# Patient Record
Sex: FEMALE | Race: OTHER | Employment: FULL TIME | URBAN - NONMETROPOLITAN AREA
[De-identification: names, ages, dates, MRNs, and addresses within clinical notes are randomized per-mention and may not be internally consistent; named-entity substitution may affect disease eponyms.]

---

## 2018-08-23 ENCOUNTER — OFFICE VISIT (OUTPATIENT)
Dept: FAMILY MEDICINE CLINIC | Age: 48
End: 2018-08-23
Payer: COMMERCIAL

## 2018-08-23 VITALS
BODY MASS INDEX: 21.24 KG/M2 | HEART RATE: 72 BPM | OXYGEN SATURATION: 98 % | WEIGHT: 108.2 LBS | RESPIRATION RATE: 12 BRPM | DIASTOLIC BLOOD PRESSURE: 58 MMHG | TEMPERATURE: 98.5 F | HEIGHT: 60 IN | SYSTOLIC BLOOD PRESSURE: 122 MMHG

## 2018-08-23 DIAGNOSIS — R10.30 LOWER ABDOMINAL PAIN: ICD-10-CM

## 2018-08-23 DIAGNOSIS — Z13.220 SCREENING CHOLESTEROL LEVEL: ICD-10-CM

## 2018-08-23 DIAGNOSIS — Z11.4 ENCOUNTER FOR SCREENING FOR HIV: ICD-10-CM

## 2018-08-23 DIAGNOSIS — R20.0 HAND NUMBNESS: ICD-10-CM

## 2018-08-23 DIAGNOSIS — R79.89 LOW VITAMIN D LEVEL: ICD-10-CM

## 2018-08-23 DIAGNOSIS — Z23 NEED FOR PROPHYLACTIC VACCINATION AGAINST DIPHTHERIA-TETANUS-PERTUSSIS (DTP): Primary | ICD-10-CM

## 2018-08-23 LAB
AMORPHOUS: ABNORMAL
BACTERIA: ABNORMAL /HPF
BILIRUBIN URINE: NEGATIVE
BILIRUBIN URINE: NEGATIVE
BILIRUBIN, POC: NORMAL
BLOOD URINE, POC: ABNORMAL
BLOOD URINE, POC: NORMAL
BLOOD, URINE: ABNORMAL
CASTS UA: ABNORMAL /LPF
CHARACTER, URINE: ABNORMAL
CHARACTER, URINE: CLEAR
CLARITY, POC: NORMAL
COLOR, POC: NORMAL
COLOR, URINE: YELLOW
COLOR: YELLOW
CRYSTALS, UA: ABNORMAL
EPITHELIAL CELLS, UA: ABNORMAL /HPF
GLUCOSE URINE, POC: NORMAL
GLUCOSE URINE: NEGATIVE MG/DL
GLUCOSE URINE: NEGATIVE MG/DL
KETONES, POC: NORMAL
KETONES, URINE: NEGATIVE
KETONES, URINE: NEGATIVE
LEUKOCYTE CLUMPS, URINE: ABNORMAL
LEUKOCYTE EST, POC: NORMAL
LEUKOCYTE ESTERASE, URINE: ABNORMAL
NITRITE, POC: NORMAL
NITRITE, URINE: NEGATIVE
NITRITE, URINE: NEGATIVE
PH UA: 5.5
PH, POC: NORMAL
PH, URINE: 6
PROTEIN UA: NEGATIVE
PROTEIN, POC: NORMAL
PROTEIN, URINE: NEGATIVE MG/DL
RBC URINE: ABNORMAL /HPF
SPECIFIC GRAVITY, POC: NORMAL
SPECIFIC GRAVITY, URINE: 1.02 (ref 1–1.03)
SPECIFIC GRAVITY, URINE: 1.02 (ref 1–1.03)
UROBILINOGEN, POC: NORMAL
UROBILINOGEN, URINE: 0.2 EU/DL
UROBILINOGEN, URINE: 1 EU/DL
WBC UA: ABNORMAL /HPF

## 2018-08-23 PROCEDURE — 99203 OFFICE O/P NEW LOW 30 MIN: CPT | Performed by: FAMILY MEDICINE

## 2018-08-23 PROCEDURE — G8427 DOCREV CUR MEDS BY ELIG CLIN: HCPCS | Performed by: FAMILY MEDICINE

## 2018-08-23 PROCEDURE — G8420 CALC BMI NORM PARAMETERS: HCPCS | Performed by: FAMILY MEDICINE

## 2018-08-23 PROCEDURE — 81003 URINALYSIS AUTO W/O SCOPE: CPT | Performed by: FAMILY MEDICINE

## 2018-08-23 PROCEDURE — 1036F TOBACCO NON-USER: CPT | Performed by: FAMILY MEDICINE

## 2018-08-23 ASSESSMENT — ENCOUNTER SYMPTOMS
NAUSEA: 0
RHINORRHEA: 0
CONSTIPATION: 0
SORE THROAT: 0
TROUBLE SWALLOWING: 0
EYE PAIN: 0
RECTAL PAIN: 0
BLOOD IN STOOL: 0
DIARRHEA: 1
ANAL BLEEDING: 0
ABDOMINAL PAIN: 1
ABDOMINAL DISTENTION: 0
VOMITING: 0
COUGH: 0
SHORTNESS OF BREATH: 0

## 2018-08-23 ASSESSMENT — PATIENT HEALTH QUESTIONNAIRE - PHQ9
SUM OF ALL RESPONSES TO PHQ9 QUESTIONS 1 & 2: 0
2. FEELING DOWN, DEPRESSED OR HOPELESS: 0
1. LITTLE INTEREST OR PLEASURE IN DOING THINGS: 0
SUM OF ALL RESPONSES TO PHQ QUESTIONS 1-9: 0
SUM OF ALL RESPONSES TO PHQ QUESTIONS 1-9: 0

## 2018-08-23 NOTE — PATIENT INSTRUCTIONS
side effects of your corticosteroid medicine, such as:  ¨ Weight gain. ¨ Mood changes. ¨ Trouble sleeping. ¨ Bruising easily.     · You have any other problems with your medicine. Where can you learn more? Go to https://InfinioclintonMoneyMenttor.Lattice [email or web address] org and sign in to your AutoeBid account. Enter R432 in the Petco box to learn more about \"Carpal Tunnel Syndrome: Care Instructions. \"     If you do not have an account, please click on the \"Sign Up Now\" link. Current as of: November 29, 2017  Content Version: 11.7  © 3718-9243 [employer]. Care instructions adapted under license by [employer] ([facility]). If you have questions about a medical condition or this instruction, always ask your healthcare professional. [address] any warranty or liability for your use of this information. Patient Education            Current as of: November 29, 2017  Content Version: 11.7  © 6037-4769 [employer]. Care instructions adapted under license by [employer] ([facility]). If you have questions about a medical condition or this instruction, always ask your healthcare professional. [address] any warranty or liability for your use of this information. Patient Education        Carpal Tunnel Syndrome: Exercises  Your Care Instructions  Here are some examples of typical rehabilitation exercises for your condition. Start each exercise slowly. Ease off the exercise if you start to have pain. Your doctor or your physical or occupational therapist will tell you when you can start these exercises and which ones will work best for you. Warm-up stretches  When you no longer have pain or numbness, you can do exercises to help prevent carpal tunnel syndrome from coming back. Do not do any stretch or movement that is uncomfortable or painful. 1. Rotate your wrist up, down, and from side to side. Repeat 4 times. 2. Stretch your fingers far apart.  Relax them, and then stretch them again. Repeat 4 times. 3. Stretch your thumb by pulling it back gently, holding it, and then releasing it. Repeat 4 times. How to do the exercises  Prayer stretch    1. Start with your palms together in front of your chest just below your chin. 2. Slowly lower your hands toward your waistline, keeping your hands close to your stomach and your palms together until you feel a mild to moderate stretch under your forearms. 3. Hold for at least 15 to 30 seconds. Repeat 2 to 4 times. Wrist flexor stretch    1. Extend your arm in front of you with your palm up. 2. Bend your wrist, pointing your hand toward the floor. 3. With your other hand, gently bend your wrist farther until you feel a mild to moderate stretch in your forearm. 4. Hold for at least 15 to 30 seconds. Repeat 2 to 4 times. Wrist extensor stretch    1. Repeat steps 1 through 4 of the stretch above, but begin with your extended hand palm down. Follow-up care is a key part of your treatment and safety. Be sure to make and go to all appointments, and call your doctor if you are having problems. It's also a good idea to know your test results and keep a list of the medicines you take. Where can you learn more? Go to https://TerraPerkspePublicBetaeb.CoachMePlus. org and sign in to your Gateway EDI account. Enter Y298 in the [facility] box to learn more about \"Carpal Tunnel Syndrome: Exercises. \"     If you do not have an account, please click on the \"Sign Up Now\" link. Current as of: November 29, 2017  Content Version: 11.7  © 2209-6235 [employer]. Care instructions adapted under license by [facility] ([facility]). If you have questions about a medical condition or this instruction, always ask your healthcare professional. [place] [address] any warranty or liability for your use of this information.        Patient Education            Current as of: November 29, 2017  Content Version: 11.7  © 9856-3196 as possible. 4. Then straighten your arm as much as you can. 5. Repeat 2 to 4 times. Wrist flexor stretch    If this exercise causes numbness, tingling, or pain in your hand, ease off of the stretch. You should not have symptoms as you stretch. If you cannot back off enough so that you can do the exercise without symptoms, stop doing the exercise right away. 1. Extend your affected arm in front of you with your palm facing away from your body. 2. Bend back your wrist on your affected arm, pointing your hand up toward the ceiling. 3. With your other hand, gently bend your wrist farther until you feel a mild to moderate stretch in your forearm. 4. Hold for at least 15 to 30 seconds. 5. Repeat 2 to 4 times. 6. Repeat steps 1 through 5, but this time extend your affected arm in front of you with your palm facing up. Then bend back your wrist, pointing your hand toward the floor. Wrist flexion and extension    If this exercise causes numbness, tingling, or pain in your hand, ease off of the stretch. You should not have symptoms as you stretch. If you cannot back off enough so that you can do the exercise without symptoms, stop doing the exercise right away. 1. Place your forearm on a table, with your affected hand and wrist extended beyond the table, palm down. 2. Slowly bend your wrist to move your hand upward and allow your hand to close into a fist. Hold for about 6 seconds. 3. Then lower your hand and allow your fingers to relax. Hold this position for about 6 seconds. You should feel a gentle stretch. 4. Repeat 8 to 12 times. Follow-up care is a key part of your treatment and safety. Be sure to make and go to all appointments, and call your doctor if you are having problems. It's also a good idea to know your test results and keep a list of the medicines you take. Where can you learn more? Go to [email or web address]://surinder.Digna [email or web address] org and sign in to your Lightwire account.  Enter E297 in the Search you are having problems. It's also a good idea to know your test results and keep a list of the medicines you take. Where can you learn more? Go to [email or web address] org and sign in to your [employer] account. Enter P962 in the [employer] box to learn more about \"Neck Spasm: Exercises. \"     If you do not have an account, please click on the \"Sign Up Now\" link. Current as of: November 29, 2017  Content Version: 11.7  © 1111-9659 [employer]. Care instructions adapted under license by [facility] ([facility]). If you have questions about a medical condition or this instruction, always ask your healthcare professional. [address] any warranty or liability for your use of this information. Patient Education        Neck: Exercises  Your Care Instructions  Here are some examples of typical rehabilitation exercises for your condition. Start each exercise slowly. Ease off the exercise if you start to have pain. Your doctor or physical therapist will tell you when you can start these exercises and which ones will work best for you. How to do the exercises  Neck stretch    25. This stretch works best if you keep your shoulder down as you lean away from it. To help you remember to do this, start by relaxing your shoulders and lightly holding on to your thighs or your chair. 26. Tilt your head toward your shoulder and hold for 15 to 30 seconds. Let the weight of your head stretch your muscles. 27. If you would like a little added stretch, use your hand to gently and steadily pull your head toward your shoulder. For example, keeping your right shoulder down, lean your head to the left. 28. Repeat 2 to 4 times toward each shoulder. Diagonal neck stretch    21. Turn your head slightly toward the direction you will be stretching, and tilt your head diagonally toward your chest and hold for 15 to 30 seconds.   22. If you would like a little added stretch, use your hand to gently and steadily pull your head forward on the diagonal.  23. Repeat 2 to 4 times toward each side. Dorsal glide stretch    The dorsal glide stretches the back of the neck. If you feel pain, do not glide so far back. Some people find this exercise easier to do while lying on their backs with an ice pack on the neck. 22. Sit or stand tall and look straight ahead. 23. Slowly tuck your chin as you glide your head backward over your body  24. Hold for a count of 6, and then relax for up to 10 seconds. 25. Repeat 8 to 12 times. Chest and shoulder stretch    18. Sit or stand tall and glide your head backward as in the dorsal glide stretch. 19. Raise both arms so that your hands are next to your ears. 20. Take a deep breath, and as you breathe out, lower your elbows down and behind your back. You will feel your shoulder blades slide down and together, and at the same time you will feel a stretch across your chest and the front of your shoulders. 21. Hold for about 6 seconds, and then relax for up to 10 seconds. 22. Repeat 8 to 12 times. Strengthening: Hands on head    18. Move your head backward, forward, and side to side against gentle pressure from your hands, holding each position for about 6 seconds. 19. Repeat 8 to 12 times. Follow-up care is a key part of your treatment and safety. Be sure to make and go to all appointments, and call your doctor if you are having problems. It's also a good idea to know your test results and keep a list of the medicines you take. Where can you learn more? Go to [email or web address] and sign in to your Phosphagenics account. Enter P975 in the [employer] box to learn more about \"Neck: Exercises. \"     If you do not have an account, please click on the \"Sign Up Now\" link. Current as of: November 29, 2017  Content Version: 11.7  © 2090-5656 [employer]. Care instructions adapted under license by [facility] ([facility]).  If you have questions about a medical condition or this instruction, always ask your healthcare professional. [place] [address] any warranty or liability for your use of this information.

## 2018-08-23 NOTE — PROGRESS NOTES
Anshul Nguyen is a 51 y.o. female who presents today for:  Chief Complaint   Patient presents with   [facility] Established New Doctor    Abdominal Cramping     x2-3 months at least    Shoulder Pain     tightness    Numbness     right hand       Goals      Exercise 3x per week (30 min per time)           HPI:     She is here as a new patient. Her previous PCP was Dr. Carleen Osorio and she is looking for a new provider that is closer to her home and work. She last saw Dr. Carleen Osorio 6 months ago for numbness in her right fingers (not thumb or index finger) at night and abdominal pain. Her stomach pain started last year - pain is daily, not relieved by bowel movement, no bloating, and is below the belly button. She notices that whenever she drinks coffee, she has diarrhea. She had a UA and kidney ultrasound last year - both were normal. She also had a kidney stone last year - passed it w/o incidence. Her last period was 2 years ago - no bleeding since. She has no known family history of gastric or colon disease and cancer. She has itch and pain with urination a couple times a year - uses OTC cream for relief.  She had her last pap smear earlier this year - doc said everything was normal. She also gets headaches when she doesn't sleep well - normally gets 5 hours of sleep.    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    Come upper cervical strain and it feels musculoskelatal     abd pain below the belly button - no blood in her stools - had a urinalysis and us - both normal - did have a kidney stone last year and passed it    She always has itching and pain with urination a few times a year and uses an otc cream    Last pap earlier this year all normal - last period 2 years ago and 1219   BP: (!) 122/58   Site: Right Arm   Position: Sitting   Cuff Size: Medium Adult   Pulse: 72   Resp: 12   Temp: 98.5 °F (36.9 °C)   TempSrc: Oral   SpO2: 98%   Weight: 108 lb 3.2 oz (49.1 kg)   Height: 5' 0.24\" (1.53 m)       Body mass index is 20.97 kg/m². Wt Readings from Last 3 Encounters:   08/23/18 108 lb 3.2 oz (49.1 kg)   06/30/17 110 lb (49.9 kg)     BP Readings from Last 3 Encounters:   08/23/18 (!) 122/58   06/30/17 (!) 146/84       Physical Exam   Constitutional: She is oriented to person, place, and time. She appears well-developed and well-nourished. No distress. HENT:   Head: Normocephalic and atraumatic. Right Ear: External ear normal.   Left Ear: External ear normal.   Eyes: Conjunctivae and EOM are normal. Right eye exhibits no discharge. Left eye exhibits no discharge. No scleral icterus. Neck: Normal range of motion. Cardiovascular: Normal rate, regular rhythm and normal heart sounds. No murmur heard. Pulmonary/Chest: Effort normal and breath sounds normal.   Abdominal: Soft. She exhibits no distension and no mass. There is tenderness. There is no rebound and no guarding. Musculoskeletal: She exhibits no edema. Neurological: She is alert and oriented to person, place, and time. No cranial nerve deficit. Skin: Skin is warm and dry. No rash noted. She is not diaphoretic. No erythema. Psychiatric: She has a normal mood and affect. Her behavior is normal. Judgment and thought content normal.   Nursing note and vitals reviewed. Lab Results   Component Value Date    TSH 0.527 12/07/2015       Imaging Results:    No results found. Assessment:      Diagnosis Orders   1. Need for prophylactic vaccination against diphtheria-tetanus-pertussis (DTP)     2. Encounter for screening for HIV  HIV Screen   3. Hand numbness  Elastic Bandages & Supports (WRIST SPLINT/COCK-UP/RIGHT SM) MISC   4.  Lower abdominal pain  Basic Metabolic Panel    CBC Auto Differential    Hepatic

## 2018-08-24 LAB
ABSOLUTE BASO #: 100 /CMM (ref 0–200)
ABSOLUTE EOS #: 100 /CMM (ref 0–500)
ABSOLUTE LYMPH #: 1800 /CMM (ref 1000–4800)
ABSOLUTE MONO #: 400 /CMM (ref 0–800)
ABSOLUTE NEUT #: 3000 /CMM (ref 1800–7700)
ALBUMIN SERPL-MCNC: 4.6 GM/DL (ref 3.5–5)
ALP BLD-CCNC: 78 IU/L (ref 39–118)
ALT SERPL-CCNC: 24 IU/L (ref 10–40)
ANION GAP SERPL CALCULATED.3IONS-SCNC: 7 MMOL/L (ref 4–12)
AST SERPL-CCNC: 23 IU/L (ref 15–41)
BASOPHILS RELATIVE PERCENT: 1.3 % (ref 0–2)
BILIRUB SERPL-MCNC: 1.3 MG/DL (ref 0.2–1)
BILIRUBIN DIRECT: 0.1 MG/DL (ref 0.1–0.2)
BUN BLDV-MCNC: 12 MG/DL (ref 7–20)
CALCIUM SERPL-MCNC: 9.9 MG/DL (ref 8.8–10.5)
CHLORIDE BLD-SCNC: 106 MEQ/L (ref 101–111)
CHOLESTEROL/HDL RELATIVE RISK: 2.7 (ref 4–4.4)
CHOLESTEROL: 225 MG/DL
CO2: 27 MEQ/L (ref 21–32)
CREAT SERPL-MCNC: 0.49 MG/DL (ref 0.6–1.3)
CREATININE CLEARANCE: >60
DIRECT-LDL / HDL RISK: 1.8
EOSINOPHILS RELATIVE PERCENT: 1.4 % (ref 0–6)
GLUCOSE: 96 MG/DL (ref 70–110)
HCT VFR BLD CALC: 42.6 % (ref 35–44)
HDLC SERPL-MCNC: 83 MG/DL
HEMOGLOBIN: 14.3 GM/DL (ref 12–15)
HIV-1 ANTIBODY: NONREACTIVE
LDL CHOLESTEROL DIRECT: 153 MG/DL
LYMPHOCYTES RELATIVE PERCENT: 33.7 % (ref 15–45)
MAGNESIUM: 2.4 MG/DL (ref 1.8–2.5)
MCH RBC QN AUTO: 31.4 PG (ref 27.5–33)
MCHC RBC AUTO-ENTMCNC: 33.6 GM/DL (ref 33–36)
MCV RBC AUTO: 93.4 CU MIC (ref 80–97)
MONOCYTES RELATIVE PERCENT: 7.1 % (ref 2–10)
NEUTROPHILS RELATIVE PERCENT: 56.5 % (ref 40–70)
NUCLEATED RBCS: 0 /100 WBC
PDW BLD-RTO: 12.7 % (ref 12–16)
PLATELET # BLD: 201 TH/CMM (ref 150–400)
POTASSIUM SERPL-SCNC: 3.9 MEQ/L (ref 3.6–5)
RBC # BLD: 4.57 MIL/CMM (ref 4–5.1)
SODIUM BLD-SCNC: 140 MEQ/L (ref 135–145)
T4 TOTAL: 11.4 UG/DL (ref 4.5–12)
TOTAL PROTEIN: 7.5 G/DL (ref 6.2–8)
TRIGL SERPL-MCNC: 64 MG/DL
TSH REFLEX: 1.43 MCIU/ML (ref 0.49–4.67)
VITAMIN D 25-HYDROXY: 11.03 NG/ML (ref 30–100)
VLDLC SERPL CALC-MCNC: 12 MG/DL
WBC # BLD: 5.4 TH/CMM (ref 4.4–10.5)

## 2018-08-25 LAB
ORGANISM: ABNORMAL
URINE CULTURE REFLEX: ABNORMAL

## 2018-08-28 ENCOUNTER — TELEPHONE (OUTPATIENT)
Dept: FAMILY MEDICINE CLINIC | Age: 48
End: 2018-08-28

## 2018-10-03 ENCOUNTER — OFFICE VISIT (OUTPATIENT)
Dept: FAMILY MEDICINE CLINIC | Age: 48
End: 2018-10-03
Payer: COMMERCIAL

## 2018-10-03 VITALS
WEIGHT: 107.2 LBS | OXYGEN SATURATION: 98 % | TEMPERATURE: 98.4 F | HEIGHT: 60 IN | SYSTOLIC BLOOD PRESSURE: 102 MMHG | HEART RATE: 85 BPM | BODY MASS INDEX: 21.05 KG/M2 | RESPIRATION RATE: 12 BRPM | DIASTOLIC BLOOD PRESSURE: 60 MMHG

## 2018-10-03 DIAGNOSIS — Z12.31 ENCOUNTER FOR SCREENING MAMMOGRAM FOR BREAST CANCER: ICD-10-CM

## 2018-10-03 DIAGNOSIS — Z23 NEED FOR PROPHYLACTIC VACCINATION AGAINST DIPHTHERIA-TETANUS-PERTUSSIS (DTP): ICD-10-CM

## 2018-10-03 DIAGNOSIS — Z23 NEED FOR PROPHYLACTIC VACCINATION AND INOCULATION AGAINST INFLUENZA: Primary | ICD-10-CM

## 2018-10-03 DIAGNOSIS — R10.33 PERIUMBILICAL ABDOMINAL PAIN: ICD-10-CM

## 2018-10-03 DIAGNOSIS — R14.0 ABDOMINAL BLOATING: ICD-10-CM

## 2018-10-03 PROCEDURE — G8484 FLU IMMUNIZE NO ADMIN: HCPCS | Performed by: NURSE PRACTITIONER

## 2018-10-03 PROCEDURE — G8420 CALC BMI NORM PARAMETERS: HCPCS | Performed by: NURSE PRACTITIONER

## 2018-10-03 PROCEDURE — 1036F TOBACCO NON-USER: CPT | Performed by: NURSE PRACTITIONER

## 2018-10-03 PROCEDURE — 99214 OFFICE O/P EST MOD 30 MIN: CPT | Performed by: NURSE PRACTITIONER

## 2018-10-03 PROCEDURE — G8427 DOCREV CUR MEDS BY ELIG CLIN: HCPCS | Performed by: NURSE PRACTITIONER

## 2018-10-03 ASSESSMENT — ENCOUNTER SYMPTOMS
NAUSEA: 0
ABDOMINAL PAIN: 0
BLOOD IN STOOL: 0
COUGH: 0
ANAL BLEEDING: 0
ABDOMINAL DISTENTION: 0
RHINORRHEA: 0
COLOR CHANGE: 0
SORE THROAT: 0
SHORTNESS OF BREATH: 0
DIARRHEA: 0
CONSTIPATION: 0
EYE DISCHARGE: 0
EYE REDNESS: 0

## 2018-10-03 NOTE — PATIENT INSTRUCTIONS
diet  · Will give info on the DASH diet    · 2,000 units of Vitamin D daily, may increase to 2 tablets daily after the first week or two. Once those are gone you can buy the 5,000 unit tablets and take one daily  · Will add a Fish Oil -  four times daily with meals, if you are eating fish for a meal you don't have to take a fish oil  · [facility] brand - Pharmaceutical grade 1,000 mg (item # [id number])  · Increase the amount of water you drink daily - half of your body weight in ounces (ex: 100 pound person = 50 oz water/day  · Increase you exercise: 30 minutes daily in addition to your daily activity. Can walk, run, job, swim, or weight train  · Limit the amount of fats, carbohydrates, and sugars you consume     · Will refer to the stomach doctor  · Will see you back 12/10/2018, sooner as needed      Patient Education        Abdominal Pain: Care Instructions  Your Care Instructions    Abdominal pain has many possible causes. Some aren't serious and get better on their own in a few days. Others need more testing and treatment. If your pain continues or gets worse, you need to be rechecked and may need more tests to find out what is wrong. You may need surgery to correct the problem. Don't ignore new symptoms, such as fever, nausea and vomiting, urination problems, pain that gets worse, and dizziness. These may be signs of a more serious problem. Your doctor may have recommended a follow-up visit in the next 8 to 12 hours. If you are not getting better, you may need more tests or treatment. The doctor has checked you carefully, but problems can develop later. If you notice any problems or new symptoms, get medical treatment right away. Follow-up care is a key part of your treatment and safety. Be sure to make and go to all appointments, and call your doctor if you are having problems. It's also a good idea to know your test results and keep a list of the medicines you take. How can you care for yourself at home?   · Rest until you feel better. · To prevent dehydration, drink plenty of fluids, enough so that your urine is light yellow or clear like water. Choose water and other caffeine-free clear liquids until you feel better. If you have kidney, heart, or liver disease and have to limit fluids, talk with your doctor before you increase the amount of fluids you drink. · If your stomach is upset, eat mild foods, such as rice, dry toast or crackers, bananas, and applesauce. Try eating several small meals instead of two or three large ones. · Wait until 48 hours after all symptoms have gone away before you have spicy foods, alcohol, and drinks that contain caffeine. · Do not eat foods that are high in fat. · Avoid anti-inflammatory medicines such as aspirin, ibuprofen (Advil, Motrin), and naproxen (Aleve). These can cause stomach upset. Talk to your doctor if you take daily aspirin for another health problem. When should you call for help? Call 911 anytime you think you may need emergency care. For example, call if:    · You passed out (lost consciousness).     · You pass maroon or very bloody stools.     · You vomit blood or what looks like coffee grounds.     · You have new, severe belly pain.    Call your doctor now or seek immediate medical care if:    · Your pain gets worse, especially if it becomes focused in one area of your belly.     · You have a new or higher fever.     · Your stools are black and look like tar, or they have streaks of blood.     · You have unexpected vaginal bleeding.     · You have symptoms of a urinary tract infection. These may include:  ¨ Pain when you urinate. ¨ Urinating more often than usual.  ¨ Blood in your urine.     · You are dizzy or lightheaded, or you feel like you may faint.    Watch closely for changes in your health, and be sure to contact your doctor if:    · You are not getting better after 1 day (24 hours). Where can you learn more? Go to [email or web address] [email or web address] and sign in to your Incuboom account. Enter Y528 in the [facility] box to learn more about \"Abdominal Pain: Care Instructions. \"     If you do not have an account, please click on the \"Sign Up Now\" link. Current as of: November 20, 2017  Content Version: 11.7  © 9371-5213 [employer]. Care instructions adapted under license by [facility] ([facility]). If you have questions about a medical condition or this instruction, always ask your healthcare professional. [place] [address] any warranty or liability for your use of this information. Patient Education        Gas and Bloating: Care Instructions  Your Care Instructions    Gas and bloating can be uncomfortable and embarrassing problems. All people pass gas, but some people produce more gas than others, sometimes enough to cause distress. It is normal to pass gas from 6 to 20 times per day. Excess gas usually is not caused by a serious health problem. Gas and bloating usually are caused by something you eat or drink, including some food supplements and medicines. Gas and bloating are usually harmless and go away without treatment. However, changing your diet can help end the problem. Some over-the-counter medicines can help prevent gas and relieve bloating. Follow-up care is a key part of your treatment and safety. Be sure to make and go to all appointments, and call your doctor if you are having problems. It's also a good idea to know your test results and keep a list of the medicines you take. How can you care for yourself at home? · Keep a food diary if you think a food gives you gas. Write down what you eat or drink. Also record when you get gas. If you notice that a food seems to cause your gas each time, avoid it and see if the gas goes away. Examples of foods that cause gas include:  ¨ Fried and fatty foods. ¨ Beans.   ¨ Vegetables such as artichokes, asparagus, broccoli, brussels sprouts, cabbage, cauliflower, cucumbers, green peppers, onions, peas, radishes, and raw potatoes. ¨ Fruits such as apricots, bananas, melons, peaches, pears, prunes, and raw apples. ¨ Wheat and wheat bran. · Soak dry beans in water overnight, then dump the water and cook the soaked beans in new water. This can help prevent gas and bloating. · If you have problems with lactose, avoid dairy products such as milk and cheese. · Try not to swallow air. Do not drink through a straw, gulp your food, or chew gum. · Take an over-the-counter medicine. Read and follow all instructions on the label. ¨ Food enzymes, such as Beano, can be added to gas-producing foods to prevent gas. ¨ Antacids, such as Maalox Anti-Gas and Mylanta Gas, can relieve bloating by making you burp. Be careful when you take over-the-counter antacid medicines. Many of these medicines have aspirin in them. Read the label to make sure that you are not taking more than the recommended dose. Too much aspirin can be harmful. ¨ Activated charcoal tablets, such as CharcoCaps, may decrease odor from gas you pass. ¨ If you have problems with lactose, you can take medicines such as Dairy Ease and Lactaid with dairy products to prevent gas and bloating. · Get some exercise regularly. When should you call for help? Call 911 anytime you think you may need emergency care. For example, call if:    · You have gas and signs of a heart attack, such as:  ¨ Chest pain or pressure. ¨ Sweating. ¨ Shortness of breath. ¨ Nausea or vomiting. ¨ Pain that spreads from the chest to the neck, jaw, or one or both shoulders or arms. ¨ Dizziness or lightheadedness. ¨ A fast or uneven pulse. After calling 911, chew 1 adult-strength aspirin. Wait for an ambulance.  Do not try to drive yourself.    Call your doctor now or seek immediate medical care if:    · You have severe belly pain.     · You have blood in your stool.    Watch closely for changes in your health, and be sure to contact your doctor

## 2018-10-03 NOTE — PROGRESS NOTES
facility-administered medications for this visit. No Known Allergies    Subjective:    Review of Systems   Constitutional: Negative for chills, fatigue and fever. HENT: Negative for congestion, ear pain, postnasal drip, rhinorrhea and sore throat. Eyes: Negative for discharge and redness. Respiratory: Negative for cough and shortness of breath. Cardiovascular: Negative for chest pain and leg swelling. Gastrointestinal: Negative for abdominal distention, abdominal pain, anal bleeding, blood in stool, constipation, diarrhea and nausea. Skin: Negative for color change and rash. Neurological: Negative for facial asymmetry, speech difficulty and weakness. Hematological: Does not bruise/bleed easily. Psychiatric/Behavioral: Negative for agitation and confusion. Objective:     Vitals:    10/03/18 1315   BP: 102/60   Pulse: 85   Resp: 12   Temp: 98.4 °F (36.9 °C)   TempSrc: Oral   SpO2: 98%   Weight: 107 lb 3.2 oz (48.6 kg)   Height: 5' 0.24\" (1.53 m)       Body mass index is 20.77 kg/m². Wt Readings from Last 3 Encounters:   10/03/18 107 lb 3.2 oz (48.6 kg)   08/23/18 108 lb 3.2 oz (49.1 kg)   06/30/17 110 lb (49.9 kg)     BP Readings from Last 3 Encounters:   10/03/18 102/60   08/23/18 (!) 122/58   06/30/17 (!) 146/84       Physical Exam   Constitutional: She is oriented to person, place, and time. She appears well-developed and well-nourished. No distress. HENT:   Head: Normocephalic and atraumatic. Right Ear: Hearing and external ear normal. No swelling. Left Ear: Hearing and external ear normal. No swelling. Nose: No mucosal edema or rhinorrhea. Right sinus exhibits no maxillary sinus tenderness and no frontal sinus tenderness. Left sinus exhibits no maxillary sinus tenderness and no frontal sinus tenderness. Mouth/Throat: Oropharynx is clear and moist. No oropharyngeal exudate or posterior oropharyngeal erythema. Eyes: Pupils are equal, round, and reactive to light.

## 2018-12-10 ENCOUNTER — OFFICE VISIT (OUTPATIENT)
Dept: FAMILY MEDICINE CLINIC | Age: 48
End: 2018-12-10
Payer: COMMERCIAL

## 2018-12-10 VITALS
HEART RATE: 73 BPM | BODY MASS INDEX: 21.93 KG/M2 | DIASTOLIC BLOOD PRESSURE: 62 MMHG | HEIGHT: 59 IN | SYSTOLIC BLOOD PRESSURE: 100 MMHG | WEIGHT: 108.8 LBS | OXYGEN SATURATION: 99 % | TEMPERATURE: 97.7 F

## 2018-12-10 DIAGNOSIS — Z00.00 WELLNESS EXAMINATION: ICD-10-CM

## 2018-12-10 DIAGNOSIS — E55.9 VITAMIN D DEFICIENCY: Primary | ICD-10-CM

## 2018-12-10 DIAGNOSIS — J30.9 ALLERGIC RHINITIS, UNSPECIFIED SEASONALITY, UNSPECIFIED TRIGGER: ICD-10-CM

## 2018-12-10 DIAGNOSIS — E78.2 MIXED HYPERLIPIDEMIA: ICD-10-CM

## 2018-12-10 PROCEDURE — 99396 PREV VISIT EST AGE 40-64: CPT | Performed by: FAMILY MEDICINE

## 2018-12-10 PROCEDURE — G8484 FLU IMMUNIZE NO ADMIN: HCPCS | Performed by: FAMILY MEDICINE

## 2018-12-10 RX ORDER — CHLORAL HYDRATE 500 MG
1000 CAPSULE ORAL 3 TIMES DAILY
Qty: 90 CAPSULE | Refills: 3 | COMMUNITY
Start: 2018-12-10 | End: 2021-03-25

## 2018-12-10 RX ORDER — FLUTICASONE PROPIONATE 50 MCG
1 SPRAY, SUSPENSION (ML) NASAL DAILY
Qty: 2 BOTTLE | Refills: 1 | Status: SHIPPED | OUTPATIENT
Start: 2018-12-10 | End: 2021-03-25

## 2018-12-10 ASSESSMENT — ENCOUNTER SYMPTOMS
ABDOMINAL PAIN: 0
EYE PAIN: 0
COUGH: 0
NAUSEA: 0
CONSTIPATION: 0
BLOOD IN STOOL: 0
VOMITING: 0
DIARRHEA: 1
SHORTNESS OF BREATH: 0
TROUBLE SWALLOWING: 0

## 2018-12-10 NOTE — PROGRESS NOTES
[address] [facility] [facility]  Dept: [phone]  Dept Fax: [phone]  Loc: [address] [place] [address] Maintenance Due   Topic Date Due    DTaP/Tdap/Td vaccine (1 - Tdap) 06/12/1988 refused    Cervical cancer screen  06/12/1990 830    Breast cancer screen  06/12/2009 pt will schedule     Flu vaccine (1) 09/01/2018 refused           Patient:  Lb Norton  Visit Date: 12/10/2018    ANTICIPATORY GUIDANCE : ADULT     WELL QUESTIONS  1. How many cups of caffeine do you drink per day? 1 cups of coffee   2. Do you exercise a minimum of 30 minutes per day, above normal activity? No, on average the patient performs 15 minutes of exercise per day    3. Do you eat a minimum of 8-10 servings of fruits and vegetables per day? No, on average the patient consumes 3 servings of fruits and vegetables per day  4. Do you drink alcohol? No  5.  How many oz of water do you drink per day?  (64 oz per day recommended)   1 bottle    EDUCATION PROVIDED  Discussed and/or Handout Given on the following items:            [] Caffeine Use: Limit to 2 cups per day   (400mg of caffeine a day)     [] Exercise: Ideal 30 minutes per day, above normal activity              [] Eating Fruits and Vegetables: Studies show 8-10 servings per day decrease BP  [] Alcohol: Ideal maximum of one cup per day for females and two cups per day for a male         Lb Norton is a 51 y.o. female who presents today for:  Chief Complaint   Patient presents with    Annual Exam       Goals      Exercise 3x per week (30 min per time)           HPI:     HPI   Tries to drink water    Last pap was march - her period stopped 2 years ago - no more hot flashes    Will get the mammogram      2 years ago she had allergies - in the summer - not this year - but the last few days her ear has been bothering her when she lays down and hears the noise    Not itchy or any pain - does (FISH OIL) 1000 MG CAPS     Sig: Take 1 capsule by mouth 3 times daily     Dispense:  90 capsule     Refill:  3    fluticasone (FLONASE) 50 MCG/ACT nasal spray     Si spray by Each Nare route daily 1 Spray in each nostril     Dispense:  2 Bottle     Refill:  1       Future Appointments  Date Time Provider Uzma Bonita   2019 3:00 PM Apolinar Dockery DO [facility]  [facility] [address]       Patient given educational materials - see patient instructions. Discussed use, benefit, and sideeffects of prescribed medications. All patient questions answered. Pt voiced understanding. Reviewed health maintenance. Instructed to continue current medications, diet and exercise. Patient agreed with treatment plan. Follow up as directed.      Electronically signed by Srinivasa Bridges DO on 12/10/2018 at 2:11 PM

## 2018-12-10 NOTE — PATIENT INSTRUCTIONS
1. You may receive a survey about your visit with us today. The feedback from our patients helps us identify what is working well and where the service to all patients can be enhanced. Thank you! 2. Please bring in ALL medications BOTTLES, including inhalers, herbal supplements, over the counter, prescribed & non-prescribed medicine. The office would like actual medication bottles and a list.  3. Please note our OFFICE POLICIES:  a. Prior to getting your labs drawn, please check with your insurance company for benefits and eligibility of lab services. Often, insurance companies cover certain tests for preventative visits only. It is patient's responsibility to see what is covered. b. We are unable to change a diagnosis after the test has been performed. c. Lab orders will not be re-printed. Please hold onto your original lab orders and take them to your lab to be completed. d. If you no show your schedule appointment three times, you be dismissed from this practice. e. Belvin Lavender must be completed 24 hours prior to your schedule appointment. 4. If the list below has been completed, PLEASE FAX RECORDS TO OUR OFFICE @ [phone]. Once the records have been received we will update your records at our office. Health Maintenance Due   Topic Date Due    DTaP/Tdap/Td vaccine (1 - Tdap) 06/12/1988    Cervical cancer screen  06/12/1990    Breast cancer screen  06/12/2009    Flu vaccine (1) 09/01/2018       Schedule your 2018 flu vaccine with Dr. Mccartney Laser call [phone]! [facility], for anyone 9 years or older   [address]   Every Monday   8:00am-11:00am and 1:00pm-3:00pm    Patient Education        Well Visit, Ages 25 to 48: Care Instructions  Your Care Instructions    Physical exams can help you stay healthy. Your doctor has checked your overall health and may have suggested ways to take good care of yourself. He or she also may have recommended tests.  At

## 2019-03-27 ENCOUNTER — OFFICE VISIT (OUTPATIENT)
Dept: FAMILY MEDICINE CLINIC | Age: 49
End: 2019-03-27
Payer: COMMERCIAL

## 2019-03-27 VITALS
SYSTOLIC BLOOD PRESSURE: 122 MMHG | DIASTOLIC BLOOD PRESSURE: 64 MMHG | OXYGEN SATURATION: 98 % | BODY MASS INDEX: 21.09 KG/M2 | HEIGHT: 59 IN | TEMPERATURE: 98.3 F | HEART RATE: 72 BPM | WEIGHT: 104.6 LBS | RESPIRATION RATE: 12 BRPM

## 2019-03-27 DIAGNOSIS — R68.89 FLU-LIKE SYMPTOMS: Primary | ICD-10-CM

## 2019-03-27 DIAGNOSIS — J40 BRONCHITIS: ICD-10-CM

## 2019-03-27 LAB
INFLUENZA VIRUS A RNA: NEGATIVE
INFLUENZA VIRUS B RNA: NEGATIVE

## 2019-03-27 PROCEDURE — G8420 CALC BMI NORM PARAMETERS: HCPCS | Performed by: NURSE PRACTITIONER

## 2019-03-27 PROCEDURE — 87502 INFLUENZA DNA AMP PROBE: CPT | Performed by: NURSE PRACTITIONER

## 2019-03-27 PROCEDURE — G8427 DOCREV CUR MEDS BY ELIG CLIN: HCPCS | Performed by: NURSE PRACTITIONER

## 2019-03-27 PROCEDURE — 99214 OFFICE O/P EST MOD 30 MIN: CPT | Performed by: NURSE PRACTITIONER

## 2019-03-27 PROCEDURE — 1036F TOBACCO NON-USER: CPT | Performed by: NURSE PRACTITIONER

## 2019-03-27 PROCEDURE — G8484 FLU IMMUNIZE NO ADMIN: HCPCS | Performed by: NURSE PRACTITIONER

## 2019-03-27 RX ORDER — AZITHROMYCIN 250 MG/1
TABLET, FILM COATED ORAL
Qty: 1 PACKET | Refills: 0 | Status: SHIPPED | OUTPATIENT
Start: 2019-03-27 | End: 2019-09-09 | Stop reason: ALTCHOICE

## 2019-03-27 ASSESSMENT — ENCOUNTER SYMPTOMS
ANAL BLEEDING: 0
EYE DISCHARGE: 0
COUGH: 1
RHINORRHEA: 1
DIARRHEA: 0
CONSTIPATION: 0
ABDOMINAL PAIN: 0
SHORTNESS OF BREATH: 0
ABDOMINAL DISTENTION: 0
BLOOD IN STOOL: 0
NAUSEA: 0
EYE REDNESS: 0
SORE THROAT: 0
COLOR CHANGE: 0

## 2019-03-27 ASSESSMENT — PATIENT HEALTH QUESTIONNAIRE - PHQ9
SUM OF ALL RESPONSES TO PHQ QUESTIONS 1-9: 0
2. FEELING DOWN, DEPRESSED OR HOPELESS: 0
SUM OF ALL RESPONSES TO PHQ9 QUESTIONS 1 & 2: 0
1. LITTLE INTEREST OR PLEASURE IN DOING THINGS: 0
SUM OF ALL RESPONSES TO PHQ QUESTIONS 1-9: 0

## 2019-05-06 ENCOUNTER — TELEPHONE (OUTPATIENT)
Dept: FAMILY MEDICINE CLINIC | Age: 49
End: 2019-05-06

## 2019-05-06 ENCOUNTER — OFFICE VISIT (OUTPATIENT)
Dept: FAMILY MEDICINE CLINIC | Age: 49
End: 2019-05-06

## 2019-05-06 VITALS
SYSTOLIC BLOOD PRESSURE: 120 MMHG | TEMPERATURE: 97.8 F | HEIGHT: 59 IN | RESPIRATION RATE: 16 BRPM | HEART RATE: 76 BPM | DIASTOLIC BLOOD PRESSURE: 67 MMHG | OXYGEN SATURATION: 98 % | WEIGHT: 106 LBS | BODY MASS INDEX: 21.37 KG/M2

## 2019-05-06 DIAGNOSIS — R10.2 PELVIC PAIN: ICD-10-CM

## 2019-05-06 DIAGNOSIS — Z00.00 WELLNESS EXAMINATION: ICD-10-CM

## 2019-05-06 DIAGNOSIS — E55.9 VITAMIN D DEFICIENCY: ICD-10-CM

## 2019-05-06 DIAGNOSIS — E78.2 MIXED HYPERLIPIDEMIA: ICD-10-CM

## 2019-05-06 DIAGNOSIS — K64.4 EXTERNAL HEMORRHOIDS: Primary | ICD-10-CM

## 2019-05-06 PROCEDURE — 99396 PREV VISIT EST AGE 40-64: CPT | Performed by: FAMILY MEDICINE

## 2019-05-06 ASSESSMENT — ENCOUNTER SYMPTOMS
ABDOMINAL PAIN: 0
TROUBLE SWALLOWING: 0
NAUSEA: 0
VOMITING: 0
CONSTIPATION: 0
DIARRHEA: 1
SHORTNESS OF BREATH: 0
COUGH: 0
BLOOD IN STOOL: 0
EYE PAIN: 0

## 2019-05-06 NOTE — PROGRESS NOTES
[address] [facility]. [facility]  Dept: [phone]  Dept Fax: [phone]  Loc: [address] [place] [address] Maintenance Due   Topic Date Due    DTaP/Tdap/Td vaccine (1 - Tdap) 06/12/1988    Cervical cancer screen  06/12/1990    Breast cancer screen  06/12/2009           Patient:  Jenn Medicine  Visit Date: 5/6/2019    ANTICIPATORY GUIDANCE : ADULT     WELL QUESTIONS  1. How many cups of caffeine do you drink per day? 1 cup of coffee   2. Do you exercise a minimum of 30 minutes per day, above normal activity? No    3. Do you eat a minimum of 8-10 servings of fruits and vegetables per day? Yes  4. Do you drink alcohol? No  5.  How many oz of water do you drink per day?  (64 oz per day recommended) 16.9 oz  EDUCATION PROVIDED  Discussed and/or Handout Given on the following items:            [] Caffeine Use: Limit to 2 cups per day   (400mg of caffeine a day)     [] Exercise: Ideal 30 minutes per day, above normal activity              [] Eating Fruits and Vegetables: Studies show 8-10 servings per day decrease BP  [] Alcohol: Ideal maximum of one cup per day for females and two cups per day for a male         Jenn Garzon is a 51 y.o. female who presents today for:  Chief Complaint   Patient presents with    Annual Exam       Goals      Exercise 3x per week (30 min per time)           HPI:     HPI     Having abdominal pain - she did see the urologist and they gave her some medication today - will see them andreas few months - to see if the bladder contractions are better with the pill    After she had a kidney stone - later she felt like her bladder is not right  - a few years ago she did have    She does have a lot of burping, pressing on her abdomen will hep her to burp and then feels a little better    Her last pap smear was a bit again - she has been seeing the gyn for that    Her age is actually 45 - her brother did the paperwork wrong - can't change it now - nothing to prove    Has rectal itching - wonders about the hemorrhoid or a medicatino to help    Sister was 48 when she had the cancer    No question data found. Past Medical History:   Diagnosis Date    Kidney stones 2017      No past surgical history on file. Family History   Problem Relation Age of Onset    No Known Problems Mother     No Known Problems Father     Breast Cancer Sister     Other Brother         brain anuerysm    No Known Problems Sister     High Blood Pressure Sister     No Known Problems Brother     No Known Problems Brother      Social History     Tobacco Use    Smoking status: Never Smoker    Smokeless tobacco: Never Used   Substance Use Topics    Alcohol use: No      Current Outpatient Medications   Medication Sig Dispense Refill    hydrocortisone-pramoxine (PROCTOFOAM-HC) 1-1 % rectal foam Place rectally 2 times daily. 1 Can 1    guaiFENesin (ROBITUSSIN) 100 MG/5ML liquid Take 10 mLs by mouth 3 times daily as needed for Cough 1 Bottle 1    Cholecalciferol (VITAMIN D3) 5000 units TABS One a day 30 tablet 5    Omega-3 Fatty Acids (FISH OIL) 1000 MG CAPS Take 1 capsule by mouth 3 times daily (Patient taking differently: Take 1,000 mg by mouth 2 times daily ) 90 capsule 3    fluticasone (FLONASE) 50 MCG/ACT nasal spray 1 spray by Each Nare route daily 1 Spray in each nostril 2 Bottle 1    Elastic Bandages & Supports (WRIST SPLINT/COCK-UP/RIGHT SM) MISC Use nightly 1 each 0    azithromycin (ZITHROMAX) 250 MG tablet Take 2 tabs (500 mg) on Day 1, and take 1 tab (250 mg) on days 2 through 5. 1 packet 0     No current facility-administered medications for this visit.       No Known Allergies    Health Maintenance   Topic Date Due    DTaP/Tdap/Td vaccine (1 - Tdap) 06/12/1988    Cervical cancer screen  06/12/1990    Breast cancer screen  06/12/2009    Flu vaccine (Season Ended) 09/01/2019    Lipid screen  08/24/2023    HIV screen Completed    Pneumococcal 0-64 years Vaccine  Aged Out       Subjective:      Review of Systems   Constitutional: Negative for chills, fatigue and fever. HENT: Negative for ear pain, postnasal drip and trouble swallowing. Eyes: Negative for pain and visual disturbance. Respiratory: Negative for cough and shortness of breath. Cardiovascular: Negative for chest pain and palpitations. Gastrointestinal: Positive for diarrhea (not every day - coffee makes it worse). Negative for abdominal pain, blood in stool, constipation, nausea and vomiting. Skin: Negative for rash. Neurological: Negative for headaches. Objective:     Vitals:    05/06/19 1500   BP: 120/67   Site: Left Upper Arm   Position: Sitting   Cuff Size: Medium Adult   Pulse: 76   Resp: 16   Temp: 97.8 °F (36.6 °C)   TempSrc: Oral   SpO2: 98%   Weight: 106 lb (48.1 kg)   Height: 4' 11.06\" (1.5 m)       Body mass index is 21.37 kg/m². Wt Readings from Last 3 Encounters:   05/06/19 106 lb (48.1 kg)   03/27/19 104 lb 9.6 oz (47.4 kg)   12/10/18 108 lb 12.8 oz (49.4 kg)     BP Readings from Last 3 Encounters:   05/06/19 120/67   03/27/19 122/64   12/10/18 100/62       Physical Exam   Constitutional: She is oriented to person, place, and time. She appears well-developed and well-nourished. No distress. HENT:   Head: Normocephalic and atraumatic. Right Ear: External ear normal.   Left Ear: External ear normal.   Eyes: Conjunctivae and EOM are normal. Right eye exhibits no discharge. Left eye exhibits no discharge. No scleral icterus. Neck: Normal range of motion. Pulmonary/Chest: Effort normal.   Musculoskeletal: She exhibits no edema. Neurological: She is alert and oriented to person, place, and time. No cranial nerve deficit. Skin: Skin is warm and dry. No rash noted. She is not diaphoretic. No erythema. Psychiatric: She has a normal mood and affect.  Her behavior is normal. Judgment and thought content normal.   Nursing note and vitals reviewed. Lab Results   Component Value Date    WBC 5.4 08/24/2018    HGB 14.3 08/24/2018    HCT 42.6 08/24/2018     08/24/2018    CHOL 225 (H) 08/24/2018    TRIG 64 08/24/2018    HDL 83 08/24/2018    LDLDIRECT 153 (H) 08/24/2018    AST 23 08/24/2018     08/24/2018    K 3.9 08/24/2018     08/24/2018    CREATININE 0.49 (L) 08/24/2018    BUN 12 08/24/2018    CO2 27 08/24/2018    TSH 0.527 12/07/2015    MG 2.4 08/24/2018    CALCIUM 9.9 08/24/2018    VITD25 11.03 (L) 08/24/2018       Imaging Results:    No results found. Assessment:      Diagnosis Orders   1. External hemorrhoids  hydrocortisone-pramoxine (PROCTOFOAM-HC) 1-1 % rectal foam   2. Pelvic pain  Basic Metabolic Panel    CBC Auto Differential    Magnesium    Vitamin D 25 Hydroxy    Lipid Panel    Hepatic Function Panel   3. Mixed hyperlipidemia  Basic Metabolic Panel    CBC Auto Differential    Magnesium    Vitamin D 25 Hydroxy    Lipid Panel    Hepatic Function Panel   4. Vitamin D deficiency  Vitamin D 25 Hydroxy   5. Wellness examination         Plan:       will follow and see how the pill for the bladder    Will try some yogurt - to help the GI health and help with the burping - can also try some prilosec OTC daily to see if that helps    Will see gyn    She does not have insurance - so will look into the memmogram    Will try to take the vit D most days    Will give some steroid foam for the hemmorrhoid    Will see you back in 6 months to ask about the vitamin d and the hemmorroid and also recheck the cholesterol and the abdominal pain    Come back in the next month if the pain is not better    Can look into darion care       No follow-ups on file.     Orders Placed:  Orders Placed This Encounter   Procedures    Basic Metabolic Panel    CBC Auto Differential    Magnesium    Vitamin D 25 Hydroxy    Lipid Panel    Hepatic Function Panel     Medications Prescribed:  Orders Placed This Encounter   Medications  hydrocortisone-pramoxine (PROCTOFOAM-HC) 1-1 % rectal foam     Sig: Place rectally 2 times daily. Dispense:  1 Can     Refill:  1       No future appointments. Patient given educational materials - see patient instructions. Discussed use, benefit, and sideeffects of prescribed medications. All patient questions answered. Pt voiced understanding. Reviewed health maintenance. Instructed to continue current medications, diet and exercise. Patient agreed with treatment plan. Follow up as directed.      Electronically signed by Dina Ashley DO on 5/6/2019 at 4:34 PM

## 2019-05-08 NOTE — TELEPHONE ENCOUNTER
Spoke with [facility] and they have a blake program to help the pt she would need to call [phone] to see if she would qualify

## 2019-09-09 ENCOUNTER — NURSE ONLY (OUTPATIENT)
Dept: LAB | Age: 49
End: 2019-09-09

## 2019-09-09 ENCOUNTER — OFFICE VISIT (OUTPATIENT)
Dept: FAMILY MEDICINE CLINIC | Age: 49
End: 2019-09-09
Payer: COMMERCIAL

## 2019-09-09 VITALS
HEIGHT: 59 IN | OXYGEN SATURATION: 99 % | HEART RATE: 60 BPM | WEIGHT: 106 LBS | SYSTOLIC BLOOD PRESSURE: 116 MMHG | RESPIRATION RATE: 12 BRPM | DIASTOLIC BLOOD PRESSURE: 62 MMHG | BODY MASS INDEX: 21.37 KG/M2

## 2019-09-09 DIAGNOSIS — E78.2 MIXED HYPERLIPIDEMIA: ICD-10-CM

## 2019-09-09 DIAGNOSIS — E55.9 VITAMIN D DEFICIENCY: ICD-10-CM

## 2019-09-09 DIAGNOSIS — M79.604 PAIN OF RIGHT LOWER EXTREMITY: ICD-10-CM

## 2019-09-09 DIAGNOSIS — E78.2 MIXED HYPERLIPIDEMIA: Primary | ICD-10-CM

## 2019-09-09 DIAGNOSIS — R25.2 MUSCLE CRAMP: ICD-10-CM

## 2019-09-09 DIAGNOSIS — K21.9 GASTROESOPHAGEAL REFLUX DISEASE, ESOPHAGITIS PRESENCE NOT SPECIFIED: ICD-10-CM

## 2019-09-09 LAB
ALBUMIN SERPL-MCNC: 4.7 G/DL (ref 3.5–5.1)
ALP BLD-CCNC: 80 U/L (ref 38–126)
ALT SERPL-CCNC: 14 U/L (ref 11–66)
ANION GAP SERPL CALCULATED.3IONS-SCNC: 13 MEQ/L (ref 8–16)
AST SERPL-CCNC: 20 U/L (ref 5–40)
BASOPHILS # BLD: 1.1 %
BASOPHILS ABSOLUTE: 0.1 THOU/MM3 (ref 0–0.1)
BILIRUB SERPL-MCNC: 0.9 MG/DL (ref 0.3–1.2)
BILIRUBIN DIRECT: < 0.2 MG/DL (ref 0–0.3)
BUN BLDV-MCNC: 16 MG/DL (ref 7–22)
CALCIUM SERPL-MCNC: 10.4 MG/DL (ref 8.5–10.5)
CHLORIDE BLD-SCNC: 103 MEQ/L (ref 98–111)
CHOLESTEROL, TOTAL: 216 MG/DL (ref 100–199)
CO2: 26 MEQ/L (ref 23–33)
CREAT SERPL-MCNC: 0.4 MG/DL (ref 0.4–1.2)
EOSINOPHIL # BLD: 1.7 %
EOSINOPHILS ABSOLUTE: 0.1 THOU/MM3 (ref 0–0.4)
ERYTHROCYTE [DISTWIDTH] IN BLOOD BY AUTOMATED COUNT: 12.2 % (ref 11.5–14.5)
ERYTHROCYTE [DISTWIDTH] IN BLOOD BY AUTOMATED COUNT: 43 FL (ref 35–45)
GFR SERPL CREATININE-BSD FRML MDRD: > 90 ML/MIN/1.73M2
GLUCOSE BLD-MCNC: 87 MG/DL (ref 70–108)
HCT VFR BLD CALC: 44.3 % (ref 37–47)
HDLC SERPL-MCNC: 80 MG/DL
HEMOGLOBIN: 14.3 GM/DL (ref 12–16)
IMMATURE GRANS (ABS): 0.01 THOU/MM3 (ref 0–0.07)
IMMATURE GRANULOCYTES: 0 %
LDL CHOLESTEROL CALCULATED: 121 MG/DL
LYMPHOCYTES # BLD: 30.6 %
LYMPHOCYTES ABSOLUTE: 1.7 THOU/MM3 (ref 1–4.8)
MAGNESIUM: 2.2 MG/DL (ref 1.6–2.4)
MCH RBC QN AUTO: 31.2 PG (ref 26–33)
MCHC RBC AUTO-ENTMCNC: 32.3 GM/DL (ref 32.2–35.5)
MCV RBC AUTO: 96.5 FL (ref 81–99)
MONOCYTES # BLD: 7.8 %
MONOCYTES ABSOLUTE: 0.4 THOU/MM3 (ref 0.4–1.3)
NUCLEATED RED BLOOD CELLS: 0 /100 WBC
PLATELET # BLD: 170 THOU/MM3 (ref 130–400)
PMV BLD AUTO: 9.6 FL (ref 9.4–12.4)
POTASSIUM SERPL-SCNC: 3.9 MEQ/L (ref 3.5–5.2)
RBC # BLD: 4.59 MILL/MM3 (ref 4.2–5.4)
SEG NEUTROPHILS: 58.6 %
SEGMENTED NEUTROPHILS ABSOLUTE COUNT: 3.2 THOU/MM3 (ref 1.8–7.7)
SODIUM BLD-SCNC: 142 MEQ/L (ref 135–145)
TOTAL PROTEIN: 7.7 G/DL (ref 6.1–8)
TRIGL SERPL-MCNC: 77 MG/DL (ref 0–199)
VITAMIN D 25-HYDROXY: 18 NG/ML (ref 30–100)
WBC # BLD: 5.4 THOU/MM3 (ref 4.8–10.8)

## 2019-09-09 PROCEDURE — G8420 CALC BMI NORM PARAMETERS: HCPCS | Performed by: FAMILY MEDICINE

## 2019-09-09 PROCEDURE — G8427 DOCREV CUR MEDS BY ELIG CLIN: HCPCS | Performed by: FAMILY MEDICINE

## 2019-09-09 PROCEDURE — 3017F COLORECTAL CA SCREEN DOC REV: CPT | Performed by: FAMILY MEDICINE

## 2019-09-09 PROCEDURE — 99214 OFFICE O/P EST MOD 30 MIN: CPT | Performed by: FAMILY MEDICINE

## 2019-09-09 PROCEDURE — 1036F TOBACCO NON-USER: CPT | Performed by: FAMILY MEDICINE

## 2019-09-09 RX ORDER — MULTIVITAMIN/IRON/FOLIC ACID 18MG-0.4MG
TABLET ORAL
Qty: 60 TABLET | Refills: 5 | Status: SHIPPED | OUTPATIENT
Start: 2019-09-09 | End: 2021-03-25

## 2019-09-09 RX ORDER — OMEPRAZOLE 20 MG/1
20 CAPSULE, DELAYED RELEASE ORAL 2 TIMES DAILY
Qty: 30 CAPSULE | Refills: 3 | Status: SHIPPED | OUTPATIENT
Start: 2019-09-09 | End: 2021-03-25

## 2019-09-09 ASSESSMENT — ENCOUNTER SYMPTOMS
TROUBLE SWALLOWING: 0
VOMITING: 0
SHORTNESS OF BREATH: 0
CONSTIPATION: 0
COUGH: 0
ABDOMINAL PAIN: 0
COLOR CHANGE: 0
BLOOD IN STOOL: 0
DIARRHEA: 0
NAUSEA: 0
EYE PAIN: 0

## 2019-09-09 NOTE — PROGRESS NOTES
Bernice Schaefer is a 47 y.o. female who presents today for:  Chief Complaint   Patient presents with    Leg Pain     Right calf pain x 2 days, pain comes and goes, denies swelling and redness        Goals      Exercise 3x per week (30 min per time)           HPI:     Patient is 46 yo female who presents with leg pain but denies swelling and erythema. She expresses tightness on the R leg only. This is episodic tightness sensation and both feet feel cold. The first episode started 2 weeks ago. She denies trauma to the area. She gets these episodes when she is nervous, rushing, emotional. The episodes last about 1 to 2 hours. She also describes dysphagia but not concerned about it today. She needs to eat slowly. Leg Pain    There was no injury mechanism. The pain is present in the right ankle and right leg. The pain is at a severity of 5/10. Pertinent negatives include no inability to bear weight, numbness or tingling. She has tried rest for the symptoms. Started 2 weeks ago had the tightness in the r leg and after resting and rubbing her feet it is gone andreas few hours    No chest pain or shortness of breath    Also the cholesterol was high      No question data found. Past Medical History:   Diagnosis Date    Kidney stones 2017      History reviewed. No pertinent surgical history.   Family History   Problem Relation Age of Onset    No Known Problems Mother     No Known Problems Father     Breast Cancer Sister     Other Brother         brain anuerysm    No Known Problems Sister     High Blood Pressure Sister     No Known Problems Brother     No Known Problems Brother      Social History     Tobacco Use    Smoking status: Never Smoker    Smokeless tobacco: Never Used   Substance Use Topics    Alcohol use: No      Current Outpatient Medications   Medication Sig Dispense Refill    Cholecalciferol (VITAMIN D3) 5000 units TABS One a day 30 tablet 5    Omega-3 Fatty Acids (FISH OIL) 1000 MG CAPS

## 2019-09-09 NOTE — PROGRESS NOTES
Pharmacy Medication History Note      List of current medications patient is taking is complete. Source of information: Patient    Medications removed (include reason, ex. therapy complete or physician discontinued):  · Azithromycin (therapy completed)  · Guaifenesin (therapy completed)    Medications added/doses adjusted:  · Added \"as needed for rhinitis\" to fluticasone nasal spray   · Changed how the patient is taking omega-3 fatty acid capsules - patient takes 1 capsule daily     Other notes (ex. Recent course of antibiotics, Coumadin dosing):  · Denies use of other OTC or herbal medications.       Allergies reviewed

## 2019-09-17 ENCOUNTER — TELEPHONE (OUTPATIENT)
Dept: FAMILY MEDICINE CLINIC | Age: 49
End: 2019-09-17

## 2019-10-01 ENCOUNTER — OFFICE VISIT (OUTPATIENT)
Dept: FAMILY MEDICINE CLINIC | Age: 49
End: 2019-10-01
Payer: COMMERCIAL

## 2019-10-01 VITALS
RESPIRATION RATE: 12 BRPM | HEART RATE: 65 BPM | SYSTOLIC BLOOD PRESSURE: 127 MMHG | WEIGHT: 107.6 LBS | TEMPERATURE: 98 F | HEIGHT: 59 IN | DIASTOLIC BLOOD PRESSURE: 73 MMHG | BODY MASS INDEX: 21.69 KG/M2 | OXYGEN SATURATION: 100 %

## 2019-10-01 DIAGNOSIS — E55.9 VITAMIN D DEFICIENCY: ICD-10-CM

## 2019-10-01 DIAGNOSIS — R52 ACHES: ICD-10-CM

## 2019-10-01 DIAGNOSIS — R20.0 NUMBNESS: Primary | ICD-10-CM

## 2019-10-01 PROCEDURE — 3017F COLORECTAL CA SCREEN DOC REV: CPT | Performed by: NURSE PRACTITIONER

## 2019-10-01 PROCEDURE — G8484 FLU IMMUNIZE NO ADMIN: HCPCS | Performed by: NURSE PRACTITIONER

## 2019-10-01 PROCEDURE — 99214 OFFICE O/P EST MOD 30 MIN: CPT | Performed by: NURSE PRACTITIONER

## 2019-10-01 PROCEDURE — G8427 DOCREV CUR MEDS BY ELIG CLIN: HCPCS | Performed by: NURSE PRACTITIONER

## 2019-10-01 PROCEDURE — G8420 CALC BMI NORM PARAMETERS: HCPCS | Performed by: NURSE PRACTITIONER

## 2019-10-01 PROCEDURE — 1036F TOBACCO NON-USER: CPT | Performed by: NURSE PRACTITIONER

## 2019-10-01 RX ORDER — MAGNESIUM 200 MG
200 TABLET ORAL
Qty: 90 TABLET | Refills: 5 | Status: SHIPPED | OUTPATIENT
Start: 2019-10-01 | End: 2021-03-25

## 2019-10-01 RX ORDER — CHOLECALCIFEROL (VITAMIN D3) 50 MCG
4000 TABLET ORAL DAILY
Qty: 30 TABLET | Refills: 5 | Status: SHIPPED | OUTPATIENT
Start: 2019-10-01

## 2019-10-01 ASSESSMENT — ENCOUNTER SYMPTOMS
TROUBLE SWALLOWING: 0
DIARRHEA: 0
CONSTIPATION: 0
COUGH: 0
SHORTNESS OF BREATH: 0
NAUSEA: 0
SORE THROAT: 0
ABDOMINAL PAIN: 0

## 2019-11-25 ENCOUNTER — OFFICE VISIT (OUTPATIENT)
Dept: FAMILY MEDICINE CLINIC | Age: 49
End: 2019-11-25
Payer: COMMERCIAL

## 2019-11-25 ENCOUNTER — NURSE ONLY (OUTPATIENT)
Dept: LAB | Age: 49
End: 2019-11-25

## 2019-11-25 VITALS
WEIGHT: 109 LBS | BODY MASS INDEX: 21.97 KG/M2 | HEART RATE: 64 BPM | SYSTOLIC BLOOD PRESSURE: 104 MMHG | RESPIRATION RATE: 12 BRPM | TEMPERATURE: 98 F | DIASTOLIC BLOOD PRESSURE: 80 MMHG | OXYGEN SATURATION: 99 % | HEIGHT: 59 IN

## 2019-11-25 DIAGNOSIS — N95.1 MENOPAUSAL SYMPTOMS: ICD-10-CM

## 2019-11-25 DIAGNOSIS — Z12.31 ENCOUNTER FOR SCREENING MAMMOGRAM FOR BREAST CANCER: Primary | ICD-10-CM

## 2019-11-25 DIAGNOSIS — Z12.11 SCREEN FOR COLON CANCER: ICD-10-CM

## 2019-11-25 DIAGNOSIS — E55.9 VITAMIN D DEFICIENCY: ICD-10-CM

## 2019-11-25 PROCEDURE — G8427 DOCREV CUR MEDS BY ELIG CLIN: HCPCS | Performed by: FAMILY MEDICINE

## 2019-11-25 PROCEDURE — G8420 CALC BMI NORM PARAMETERS: HCPCS | Performed by: FAMILY MEDICINE

## 2019-11-25 PROCEDURE — 3017F COLORECTAL CA SCREEN DOC REV: CPT | Performed by: FAMILY MEDICINE

## 2019-11-25 PROCEDURE — G8484 FLU IMMUNIZE NO ADMIN: HCPCS | Performed by: FAMILY MEDICINE

## 2019-11-25 PROCEDURE — 99214 OFFICE O/P EST MOD 30 MIN: CPT | Performed by: FAMILY MEDICINE

## 2019-11-25 PROCEDURE — 1036F TOBACCO NON-USER: CPT | Performed by: FAMILY MEDICINE

## 2019-11-25 ASSESSMENT — ENCOUNTER SYMPTOMS
DIARRHEA: 0
COUGH: 0
NAUSEA: 0
TROUBLE SWALLOWING: 0
SHORTNESS OF BREATH: 0
ABDOMINAL PAIN: 1
VOMITING: 0
BLOOD IN STOOL: 0
EYE PAIN: 0
CONSTIPATION: 0

## 2019-11-28 LAB — DHEAS (DHEA SULFATE): 168 UG/DL (ref 26–200)

## 2019-11-29 LAB — DHEA UNCONJUGATED: 4.65 NG/ML (ref 0.63–4.7)

## 2019-12-04 ENCOUNTER — TELEPHONE (OUTPATIENT)
Dept: FAMILY MEDICINE CLINIC | Age: 49
End: 2019-12-04

## 2019-12-04 ENCOUNTER — NURSE TRIAGE (OUTPATIENT)
Dept: OTHER | Facility: CLINIC | Age: 49
End: 2019-12-04

## 2019-12-04 ENCOUNTER — OFFICE VISIT (OUTPATIENT)
Dept: FAMILY MEDICINE CLINIC | Age: 49
End: 2019-12-04
Payer: COMMERCIAL

## 2019-12-04 VITALS
HEIGHT: 59 IN | SYSTOLIC BLOOD PRESSURE: 124 MMHG | WEIGHT: 109.2 LBS | TEMPERATURE: 98 F | HEART RATE: 70 BPM | BODY MASS INDEX: 22.01 KG/M2 | RESPIRATION RATE: 12 BRPM | OXYGEN SATURATION: 99 % | DIASTOLIC BLOOD PRESSURE: 80 MMHG

## 2019-12-04 DIAGNOSIS — H81.12 BENIGN PAROXYSMAL POSITIONAL VERTIGO OF LEFT EAR: Primary | ICD-10-CM

## 2019-12-04 DIAGNOSIS — E55.9 VITAMIN D DEFICIENCY: ICD-10-CM

## 2019-12-04 DIAGNOSIS — N95.1 MENOPAUSAL SYMPTOMS: ICD-10-CM

## 2019-12-04 DIAGNOSIS — E78.2 MIXED HYPERLIPIDEMIA: ICD-10-CM

## 2019-12-04 PROCEDURE — G8427 DOCREV CUR MEDS BY ELIG CLIN: HCPCS | Performed by: NURSE PRACTITIONER

## 2019-12-04 PROCEDURE — 3017F COLORECTAL CA SCREEN DOC REV: CPT | Performed by: NURSE PRACTITIONER

## 2019-12-04 PROCEDURE — G8420 CALC BMI NORM PARAMETERS: HCPCS | Performed by: NURSE PRACTITIONER

## 2019-12-04 PROCEDURE — 99214 OFFICE O/P EST MOD 30 MIN: CPT | Performed by: NURSE PRACTITIONER

## 2019-12-04 PROCEDURE — G8484 FLU IMMUNIZE NO ADMIN: HCPCS | Performed by: NURSE PRACTITIONER

## 2019-12-04 PROCEDURE — 1036F TOBACCO NON-USER: CPT | Performed by: NURSE PRACTITIONER

## 2019-12-04 RX ORDER — MECLIZINE HYDROCHLORIDE 25 MG/1
1 TABLET ORAL EVERY 6 HOURS PRN
Refills: 0 | COMMUNITY
Start: 2019-12-02 | End: 2021-03-25

## 2019-12-04 RX ORDER — ONDANSETRON 4 MG/1
1 TABLET, ORALLY DISINTEGRATING ORAL EVERY 6 HOURS PRN
Refills: 0 | COMMUNITY
Start: 2019-12-02 | End: 2019-12-04

## 2019-12-04 ASSESSMENT — ENCOUNTER SYMPTOMS
CONSTIPATION: 0
ABDOMINAL DISTENTION: 0
RHINORRHEA: 0
COLOR CHANGE: 0
NAUSEA: 0
ANAL BLEEDING: 0
SORE THROAT: 0
COUGH: 0
DIARRHEA: 0
BLOOD IN STOOL: 0
ABDOMINAL PAIN: 0
EYE DISCHARGE: 0
EYE REDNESS: 0
SHORTNESS OF BREATH: 0

## 2019-12-10 ENCOUNTER — TELEPHONE (OUTPATIENT)
Dept: FAMILY MEDICINE CLINIC | Age: 49
End: 2019-12-10

## 2020-01-30 ENCOUNTER — PATIENT MESSAGE (OUTPATIENT)
Dept: FAMILY MEDICINE CLINIC | Age: 50
End: 2020-01-30

## 2020-04-01 ENCOUNTER — PATIENT MESSAGE (OUTPATIENT)
Dept: FAMILY MEDICINE CLINIC | Age: 50
End: 2020-04-01

## 2020-04-20 ENCOUNTER — TELEPHONE (OUTPATIENT)
Dept: FAMILY MEDICINE CLINIC | Age: 50
End: 2020-04-20

## 2020-04-20 NOTE — TELEPHONE ENCOUNTER
\"We want to confirm that, for purposes of billing, this is a virtual visit with your provider   for which we will submit a claim for reimbursement with your insurance company. You   will be responsible for any copays, coinsurance amounts or other amounts not covered   by your insurance company. If you do not accept this, unfortunately we will not be able   to schedule a virtual visit with the provider. Do you accept? \"     Altagracia Hammer stated \"I Rosaura Plants"     CANCELED APPOINTMENT

## 2020-04-27 ENCOUNTER — TELEPHONE (OUTPATIENT)
Dept: FAMILY MEDICINE CLINIC | Age: 50
End: 2020-04-27

## 2021-03-25 ENCOUNTER — OFFICE VISIT (OUTPATIENT)
Dept: FAMILY MEDICINE CLINIC | Age: 51
End: 2021-03-25
Payer: COMMERCIAL

## 2021-03-25 ENCOUNTER — NURSE ONLY (OUTPATIENT)
Dept: LAB | Age: 51
End: 2021-03-25

## 2021-03-25 VITALS
RESPIRATION RATE: 12 BRPM | TEMPERATURE: 96.8 F | SYSTOLIC BLOOD PRESSURE: 112 MMHG | HEART RATE: 89 BPM | WEIGHT: 110.2 LBS | DIASTOLIC BLOOD PRESSURE: 76 MMHG | OXYGEN SATURATION: 99 % | HEIGHT: 60 IN | BODY MASS INDEX: 21.64 KG/M2

## 2021-03-25 DIAGNOSIS — G47.9 DIFFICULTY SLEEPING: ICD-10-CM

## 2021-03-25 DIAGNOSIS — Z12.11 COLON CANCER SCREENING: ICD-10-CM

## 2021-03-25 DIAGNOSIS — M22.2X2 PATELLOFEMORAL PAIN SYNDROME OF BOTH KNEES: ICD-10-CM

## 2021-03-25 DIAGNOSIS — E78.2 MIXED HYPERLIPIDEMIA: ICD-10-CM

## 2021-03-25 DIAGNOSIS — N95.1 MENOPAUSAL SYMPTOMS: ICD-10-CM

## 2021-03-25 DIAGNOSIS — E55.9 VITAMIN D DEFICIENCY: ICD-10-CM

## 2021-03-25 DIAGNOSIS — M22.2X1 PATELLOFEMORAL PAIN SYNDROME OF BOTH KNEES: ICD-10-CM

## 2021-03-25 DIAGNOSIS — J30.9 ALLERGIC RHINITIS, UNSPECIFIED SEASONALITY, UNSPECIFIED TRIGGER: ICD-10-CM

## 2021-03-25 DIAGNOSIS — Z00.00 WELLNESS EXAMINATION: Primary | ICD-10-CM

## 2021-03-25 PROBLEM — K21.9 GERD (GASTROESOPHAGEAL REFLUX DISEASE): Status: RESOLVED | Noted: 2019-09-09 | Resolved: 2021-03-25

## 2021-03-25 LAB
CHOLESTEROL, TOTAL: 211 MG/DL (ref 100–199)
HDLC SERPL-MCNC: 77 MG/DL
LDL CHOLESTEROL CALCULATED: 124 MG/DL
TRIGL SERPL-MCNC: 51 MG/DL (ref 0–199)
TSH SERPL DL<=0.05 MIU/L-ACNC: 1.14 UIU/ML (ref 0.4–4.2)

## 2021-03-25 PROCEDURE — G8484 FLU IMMUNIZE NO ADMIN: HCPCS | Performed by: FAMILY MEDICINE

## 2021-03-25 PROCEDURE — 99396 PREV VISIT EST AGE 40-64: CPT | Performed by: FAMILY MEDICINE

## 2021-03-25 SDOH — ECONOMIC STABILITY: INCOME INSECURITY: HOW HARD IS IT FOR YOU TO PAY FOR THE VERY BASICS LIKE FOOD, HOUSING, MEDICAL CARE, AND HEATING?: NOT HARD AT ALL

## 2021-03-25 SDOH — ECONOMIC STABILITY: FOOD INSECURITY: WITHIN THE PAST 12 MONTHS, YOU WORRIED THAT YOUR FOOD WOULD RUN OUT BEFORE YOU GOT MONEY TO BUY MORE.: NEVER TRUE

## 2021-03-25 SDOH — ECONOMIC STABILITY: TRANSPORTATION INSECURITY
IN THE PAST 12 MONTHS, HAS LACK OF TRANSPORTATION KEPT YOU FROM MEETINGS, WORK, OR FROM GETTING THINGS NEEDED FOR DAILY LIVING?: NO

## 2021-03-25 SDOH — ECONOMIC STABILITY: FOOD INSECURITY: WITHIN THE PAST 12 MONTHS, THE FOOD YOU BOUGHT JUST DIDN'T LAST AND YOU DIDN'T HAVE MONEY TO GET MORE.: NEVER TRUE

## 2021-03-25 ASSESSMENT — ENCOUNTER SYMPTOMS
SHORTNESS OF BREATH: 0
VOMITING: 0
BLOOD IN STOOL: 0
TROUBLE SWALLOWING: 0
ABDOMINAL PAIN: 0
NAUSEA: 0
COUGH: 0
CONSTIPATION: 1
DIARRHEA: 0
EYE PAIN: 0

## 2021-03-25 ASSESSMENT — PATIENT HEALTH QUESTIONNAIRE - PHQ9
SUM OF ALL RESPONSES TO PHQ9 QUESTIONS 1 & 2: 0
SUM OF ALL RESPONSES TO PHQ QUESTIONS 1-9: 0

## 2021-03-25 NOTE — PATIENT INSTRUCTIONS
your hip and chest toward the wall until you feel a stretch in the calf of your back leg. 3. Hold the stretch for at least 15 to 30 seconds. 4. Repeat 2 to 4 times. 5. Repeat steps 1 through 4, but this time keep your back knee bent. Quadriceps stretch   1. If you are not steady on your feet, hold on to a chair, counter, or wall. 2. Bend your affected leg, and reach behind you to grab the front of your foot or ankle with the hand on the same side. For example, if you are stretching your right leg, use your right hand. 3. Keeping your knees next to each other, pull your foot toward your buttock until you feel a gentle stretch across the front of your hip and down the front of your thigh. Your knee should be pointed directly to the ground, and not out to the side. 4. Hold the stretch for at least 15 to 30 seconds. 5. Repeat 2 to 4 times. Hamstring wall stretch   1. Lie on your back in a doorway, with your good leg through the open door. 2. Slide your affected leg up the wall to straighten your knee. You should feel a gentle stretch down the back of your leg. 3. Hold the stretch for at least 1 minute. Then over time, try to lengthen the time you hold the stretch to as long as 6 minutes. 4. Repeat 2 to 4 times. 5. If you do not have a place to do this exercise in a doorway, there is another way to do it:  6. Lie on your back, and bend your affected leg. 7. Loop a towel under the ball and toes of that foot, and hold the ends of the towel in your hands. 8. Straighten your knee, and slowly pull back on the towel. You should feel a gentle stretch down the back of your leg. 9. Hold the stretch for at least 15 to 30 seconds. Or even better, hold the stretch for 1 minute if you can. 10. Repeat 2 to 4 times. 1. Do not arch your back. 2. Do not bend either knee. 3. Keep one heel touching the floor and the other heel touching the wall. Do not point your toes. Quad sets   1.  Sit with your affected leg straight and supported on the floor or a firm bed. Place a small, rolled-up towel under your affected knee. Your other leg should be bent, with that foot flat on the floor. 2. Tighten the thigh muscles of your affected leg by pressing the back of your knee down into the towel. 3. Hold for about 6 seconds, then rest for up to 10 seconds. 4. Repeat 8 to 12 times. Straight-leg raises to the front   1. Lie on your back with your good knee bent so that your foot rests flat on the floor. Your affected leg should be straight. Make sure that your low back has a normal curve. You should be able to slip your hand in between the floor and the small of your back, with your palm touching the floor and your back touching the back of your hand. 2. Tighten the thigh muscles in your affected leg by pressing the back of your knee flat down to the floor. Hold your knee straight. 3. Keeping the thigh muscles tight and your leg straight, lift your affected leg up so that your heel is about 12 inches off the floor. 4. Hold for about 6 seconds, then lower your leg slowly. Rest for up to 10 seconds between repetitions. 5. Repeat 8 to 12 times. Straight-leg raises to the back   1. Lie on your stomach, and lift your leg straight up behind you (toward the ceiling). 2. Lift your toes about 6 inches off the floor, hold for about 6 seconds, then lower slowly. 3. Do 8 to 12 repetitions. Wall slide with ball squeeze   1. Stand with your back against a wall and with your feet about shoulder-width apart. Your feet should be about 12 inches away from the wall. 2. Put a ball about the size of a soccer ball between your knees. Then slowly slide down the wall until your knees are bent about 20 to 30 degrees. 3. Tighten your thigh muscles by squeezing the ball between your knees. Hold that position for about 10 seconds, then stop squeezing. Rest for up to 10 seconds between repetitions. 4. Repeat 8 to 12 times.     Follow-up care is a key part of your treatment and safety. Be sure to make and go to all appointments, and call your doctor if you are having problems. It's also a good idea to know your test results and keep a list of the medicines you take. Where can you learn more? Go to https://chpenandinieweb.LibraryThing. org and sign in to your [employer] account. Enter A404 in the Pine[employer]io box to learn more about \"Patellofemoral Pain Syndrome (Runner's Knee): Exercises. \"     If you do not have an account, please click on the \"Sign Up Now\" link. Current as of: November 16, 2020               Content Version: 12.8  © 2006-2021 [employer]. Care instructions adapted under license by [facility] ([facility]). If you have questions about a medical condition or this instruction, always ask your healthcare professional. [facility] any warranty or liability for your use of this information.        Will try not to do the phone any more before bed - and find something else to do before bed - worry journal    Will think about doxepin for sleep - 10 mg and can take up to 3    Will do the cologuard for the colon cancer    Can check the dhea and vit d as you take those supplement    Can take motrin or naprosyn for the knee pains - and do the exercises    Will do some bloodowrk to check the thyroid    The ASCVD Risk score (Maria Ines Lara, et al., 2013) failed to calculate for the following reasons:    Unable to determine if patient is Non-

## 2021-03-25 NOTE — PROGRESS NOTES
[id number] [facility] [place] [address] [place] [address]  Dept: [phone]  Loc: [phone]     Maranda Mccallum is a 45 y.o. female who presents today for:  Chief Complaint   Patient presents with    Annual Exam     Pt presents for an annual check up. Pt states she has been doing good.  Colon Cancer Screening     Pt would like to get a cologaurd done. Goals      Exercise 3x per week (30 min per time)           HPI:     HPI   Takes the vitamin D - 2 pills every few days and one pill every once in a while - may get constipated with it and takes - does not take it every day - maybe a few times a week    The dhea she takes on and off - thinks maybe it helps - she does not forget as much    She still does forget things as she does not sleep that well and then her brain is more lost - she meyer snot take anything will drink milk with honey sometimes - then has to go urinate. Melatonin will occasionally work. Her knee hurts - di dnot fall but inside it feels hurt     No question data found. Past Medical History:   Diagnosis Date    Kidney stones 2017      No past surgical history on file.   Family History   Problem Relation Age of Onset    No Known Problems Mother     No Known Problems Father     Breast Cancer Sister     Other Brother         brain anuerysm    No Known Problems Sister     High Blood Pressure Sister     No Known Problems Brother     No Known Problems Brother      Social History     Tobacco Use    Smoking status: Never Smoker    Smokeless tobacco: Never Used   Substance Use Topics    Alcohol use: No      Current Outpatient Medications   Medication Sig Dispense Refill    Cholecalciferol (VITAMIN D) 2000 units TABS tablet Take 2 tablets by mouth daily 30 tablet 5    Cholecalciferol (VITAMIN D3) 5000 units TABS One a day 30 tablet 5    meclizine (ANTIVERT) 25 MG tablet Take 1 tablet by mouth every 6 hours as needed  0    magnesium 200 MG TABS tablet Take 1 tablet by mouth 4 times daily (after meals and at bedtime) (Patient not taking: Reported on 3/25/2021) 90 tablet 5    omeprazole (PRILOSEC) 20 MG delayed release capsule Take 1 capsule by mouth 2 times daily (Patient not taking: Reported on 3/25/2021) 30 capsule 3    Magnesium Oxide 250 MG TABS One twice a day (Patient not taking: Reported on 3/25/2021) 60 tablet 5    hydrocortisone-pramoxine (PROCTOFOAM-HC) 1-1 % rectal foam Place rectally 2 times daily. (Patient not taking: Reported on 3/25/2021) 1 Can 1    Omega-3 Fatty Acids (FISH OIL) 1000 MG CAPS Take 1 capsule by mouth 3 times daily (Patient not taking: Reported on 3/25/2021) 90 capsule 3    fluticasone (FLONASE) 50 MCG/ACT nasal spray 1 spray by Each Nare route daily 1 Spray in each nostril (Patient not taking: Reported on 3/25/2021) 2 Bottle 1    Elastic Bandages & Supports (WRIST SPLINT/COCK-UP/RIGHT SM) MISC Use nightly (Patient not taking: Reported on 3/25/2021) 1 each 0     No current facility-administered medications for this visit. No Known Allergies    Health Maintenance   Topic Date Due    Hepatitis C screen  Never done    COVID-19 Vaccine (1) Never done    DTaP/Tdap/Td vaccine (1 - Tdap) Never done    Cervical cancer screen  Never done    Breast cancer screen  Never done    Shingles Vaccine (1 of 2) Never done    Colon cancer screen colonoscopy  Never done    Flu vaccine (1) Never done    Lipid screen  09/09/2024    HIV screen  Completed    Hepatitis A vaccine  Aged Out    Hepatitis B vaccine  Aged Out    Hib vaccine  Aged Out    Meningococcal (ACWY) vaccine  Aged Out    Pneumococcal 0-64 years Vaccine  Aged Out       Subjective:      Review of Systems   Constitutional: Negative for chills, fatigue and fever. HENT: Negative for ear pain, postnasal drip and trouble swallowing. Eyes: Negative for pain and visual disturbance.    Respiratory: Negative for cough and shortness of breath. Cardiovascular: Negative for chest pain and palpitations. Gastrointestinal: Positive for constipation. Negative for abdominal pain, blood in stool, diarrhea, nausea and vomiting. Genitourinary: Negative for difficulty urinating. Musculoskeletal: Positive for arthralgias. Skin: Negative for rash. Neurological: Negative for headaches. Psychiatric/Behavioral: Negative for agitation. Objective:     Vitals:    03/25/21 0823   BP: 112/76   Pulse: 89   Resp: 12   Temp: 96.8 °F (36 °C)   SpO2: 99%   Weight: 110 lb 3.2 oz (50 kg)   Height: 5' (1.524 m)       Body mass index is 21.52 kg/m². Wt Readings from Last 3 Encounters:   03/25/21 110 lb 3.2 oz (50 kg)   12/04/19 109 lb 3.2 oz (49.5 kg)   11/25/19 109 lb (49.4 kg)     BP Readings from Last 3 Encounters:   03/25/21 112/76   12/04/19 124/80   11/25/19 104/80       Physical Exam  Vitals signs and nursing note reviewed. Constitutional:       General: She is not in acute distress. Appearance: She is well-developed. She is not diaphoretic. HENT:      Head: Normocephalic and atraumatic. Right Ear: External ear normal.      Left Ear: External ear normal.   Eyes:      General: No scleral icterus. Right eye: No discharge. Left eye: No discharge. Conjunctiva/sclera: Conjunctivae normal.   Neck:      Musculoskeletal: Normal range of motion. Cardiovascular:      Rate and Rhythm: Normal rate and regular rhythm. Heart sounds: Normal heart sounds. No murmur. Pulmonary:      Effort: Pulmonary effort is normal.      Breath sounds: Normal breath sounds. Musculoskeletal:        Legs:    Skin:     General: Skin is warm and dry. Findings: No erythema or rash. Neurological:      Mental Status: She is alert and oriented to person, place, and time. Cranial Nerves: No cranial nerve deficit. Psychiatric:         Behavior: Behavior normal.         Thought Content:  Thought content normal. Judgment: Judgment normal.           Lab Results   Component Value Date    WBC 7.6 12/01/2019    HGB 14.0 12/01/2019    HCT 42.5 12/01/2019     12/01/2019    CHOL 216 (H) 09/09/2019    TRIG 77 09/09/2019    HDL 80 09/09/2019    LDLDIRECT 153 (H) 08/24/2018    LDLCALC 121 09/09/2019    AST 17 12/01/2019     12/01/2019    K 4.0 12/01/2019     12/01/2019    CREATININE 0.52 (L) 12/01/2019    BUN 13 12/01/2019    CO2 27 12/01/2019    TSH 0.527 12/07/2015    INR 1.03 12/01/2019    LABGLOM >90 09/09/2019    MG 2.2 09/09/2019    CALCIUM 9.50 12/01/2019    VITD25 18 (L) 09/09/2019       Imaging Results:    No results found. Assessment:      Diagnosis Orders   1. Wellness examination     2. Colon cancer screening  Cologuard (For External Results Only)   3. Menopausal symptoms  Basic Metabolic Panel    CBC Auto Differential    Lipid Panel    TSH with Reflex    Vitamin D 25 Hydroxy    Magnesium    DHEA    DHEA-Sulfate   4. Mixed hyperlipidemia  Basic Metabolic Panel    CBC Auto Differential    Lipid Panel    TSH with Reflex    Vitamin D 25 Hydroxy    Magnesium    DHEA    DHEA-Sulfate   5. Vitamin D deficiency  Vitamin D 25 Hydroxy   6. Allergic rhinitis, unspecified seasonality, unspecified trigger     7. Difficulty sleeping  TSH with Reflex   8. Patellofemoral pain syndrome of both knees         Plan:          Will try not to do the phone any more before bed - and find something else to do before bed - worry journal    Will think about doxepin for sleep - 10 mg and can take up to 3    Will do the cologuard for the colon cancer    Can check the dhea and vit d as you take those supplement    Can take motrin or naprosyn for the knee pains - and do the exercises    Will do some bloodowrk to check the thyroid    The ASCVD Risk score (Brad Tafoya, et al., 2013) failed to calculate for the following reasons:    Unable to determine if patient is Non-         No follow-ups on file.    Orders Placed:  Orders Placed This Encounter   Procedures    Cologuard (For External Results Only)    Basic Metabolic Panel    CBC Auto Differential    Lipid Panel    TSH with Reflex    Vitamin D 25 Hydroxy    Magnesium    DHEA    DHEA-Sulfate     Medications Prescribed:  No orders of the defined types were placed in this encounter. No future appointments. Patient given educational materials - see patient instructions. Discussed use, benefit, and sideeffects of prescribed medications. All patient questions answered. Pt voiced understanding. Reviewed health maintenance. Instructed to continue current medications, diet and exercise. Patient agreed with treatment plan. Follow up as directed. I was present for the key portions of the exam and history and confirmed all areas of the note with the patient, staff and the student.       Electronically signed by Mickey Alicea DO on 3/25/2021 at 8:55 AM

## 2021-08-11 ENCOUNTER — NURSE ONLY (OUTPATIENT)
Dept: LAB | Age: 51
End: 2021-08-11

## 2021-08-11 ENCOUNTER — OFFICE VISIT (OUTPATIENT)
Dept: FAMILY MEDICINE CLINIC | Age: 51
End: 2021-08-11
Payer: COMMERCIAL

## 2021-08-11 VITALS
HEART RATE: 68 BPM | RESPIRATION RATE: 16 BRPM | WEIGHT: 110.8 LBS | HEIGHT: 60 IN | TEMPERATURE: 98.9 F | SYSTOLIC BLOOD PRESSURE: 112 MMHG | DIASTOLIC BLOOD PRESSURE: 64 MMHG | BODY MASS INDEX: 21.75 KG/M2

## 2021-08-11 DIAGNOSIS — Z12.31 BREAST CANCER SCREENING BY MAMMOGRAM: ICD-10-CM

## 2021-08-11 DIAGNOSIS — N95.1 MENOPAUSAL SYMPTOMS: ICD-10-CM

## 2021-08-11 DIAGNOSIS — R14.0 ABDOMINAL BLOATING: ICD-10-CM

## 2021-08-11 DIAGNOSIS — R14.0 ABDOMINAL BLOATING: Primary | ICD-10-CM

## 2021-08-11 DIAGNOSIS — Z11.59 ENCOUNTER FOR HEPATITIS C SCREENING TEST FOR LOW RISK PATIENT: ICD-10-CM

## 2021-08-11 DIAGNOSIS — R19.7 DIARRHEA, UNSPECIFIED TYPE: ICD-10-CM

## 2021-08-11 DIAGNOSIS — Z12.11 COLON CANCER SCREENING: ICD-10-CM

## 2021-08-11 DIAGNOSIS — E55.9 VITAMIN D DEFICIENCY: ICD-10-CM

## 2021-08-11 DIAGNOSIS — E78.2 MIXED HYPERLIPIDEMIA: ICD-10-CM

## 2021-08-11 DIAGNOSIS — Z01.419 WELL FEMALE EXAM WITH ROUTINE GYNECOLOGICAL EXAM: ICD-10-CM

## 2021-08-11 LAB
ALBUMIN SERPL-MCNC: 4.9 G/DL (ref 3.5–5.1)
ALP BLD-CCNC: 104 U/L (ref 38–126)
ALT SERPL-CCNC: 19 U/L (ref 11–66)
ANION GAP SERPL CALCULATED.3IONS-SCNC: 11 MEQ/L (ref 8–16)
AST SERPL-CCNC: 22 U/L (ref 5–40)
BASOPHILS # BLD: 1.2 %
BASOPHILS ABSOLUTE: 0.1 THOU/MM3 (ref 0–0.1)
BILIRUB SERPL-MCNC: 0.9 MG/DL (ref 0.3–1.2)
BILIRUBIN DIRECT: < 0.2 MG/DL (ref 0–0.3)
BILIRUBIN URINE: NEGATIVE
BLOOD URINE, POC: NORMAL
BUN BLDV-MCNC: 13 MG/DL (ref 7–22)
CALCIUM SERPL-MCNC: 10.4 MG/DL (ref 8.5–10.5)
CHARACTER, URINE: CLEAR
CHLORIDE BLD-SCNC: 103 MEQ/L (ref 98–111)
CO2: 27 MEQ/L (ref 23–33)
COLOR, URINE: YELLOW
CREAT SERPL-MCNC: 0.5 MG/DL (ref 0.4–1.2)
EOSINOPHIL # BLD: 3.3 %
EOSINOPHILS ABSOLUTE: 0.2 THOU/MM3 (ref 0–0.4)
ERYTHROCYTE [DISTWIDTH] IN BLOOD BY AUTOMATED COUNT: 11.9 % (ref 11.5–14.5)
ERYTHROCYTE [DISTWIDTH] IN BLOOD BY AUTOMATED COUNT: 42.3 FL (ref 35–45)
GFR SERPL CREATININE-BSD FRML MDRD: > 90 ML/MIN/1.73M2
GLUCOSE BLD-MCNC: 95 MG/DL (ref 70–108)
GLUCOSE URINE: NEGATIVE MG/DL
HCT VFR BLD CALC: 43.4 % (ref 37–47)
HEMOGLOBIN: 13.8 GM/DL (ref 12–16)
HEPATITIS C ANTIBODY: NEGATIVE
IMMATURE GRANS (ABS): 0.01 THOU/MM3 (ref 0–0.07)
IMMATURE GRANULOCYTES: 0.2 %
KETONES, URINE: NEGATIVE
LEUKOCYTE CLUMPS, URINE: NEGATIVE
LYMPHOCYTES # BLD: 38.6 %
LYMPHOCYTES ABSOLUTE: 1.9 THOU/MM3 (ref 1–4.8)
MAGNESIUM: 2.2 MG/DL (ref 1.6–2.4)
MCH RBC QN AUTO: 30.9 PG (ref 26–33)
MCHC RBC AUTO-ENTMCNC: 31.8 GM/DL (ref 32.2–35.5)
MCV RBC AUTO: 97.1 FL (ref 81–99)
MONOCYTES # BLD: 8.5 %
MONOCYTES ABSOLUTE: 0.4 THOU/MM3 (ref 0.4–1.3)
NITRITE, URINE: NEGATIVE
NUCLEATED RED BLOOD CELLS: 0 /100 WBC
PH, URINE: 6 (ref 5–9)
PLATELET # BLD: 178 THOU/MM3 (ref 130–400)
PMV BLD AUTO: 10.4 FL (ref 9.4–12.4)
POTASSIUM SERPL-SCNC: 4 MEQ/L (ref 3.5–5.2)
PROTEIN, URINE: NEGATIVE MG/DL
RBC # BLD: 4.47 MILL/MM3 (ref 4.2–5.4)
SEG NEUTROPHILS: 48.2 %
SEGMENTED NEUTROPHILS ABSOLUTE COUNT: 2.3 THOU/MM3 (ref 1.8–7.7)
SODIUM BLD-SCNC: 141 MEQ/L (ref 135–145)
SPECIFIC GRAVITY, URINE: 1.02 (ref 1–1.03)
TOTAL PROTEIN: 7.5 G/DL (ref 6.1–8)
UROBILINOGEN, URINE: 0.2 EU/DL (ref 0–1)
VITAMIN D 25-HYDROXY: 20 NG/ML (ref 30–100)
WBC # BLD: 4.8 THOU/MM3 (ref 4.8–10.8)

## 2021-08-11 PROCEDURE — 99214 OFFICE O/P EST MOD 30 MIN: CPT | Performed by: NURSE PRACTITIONER

## 2021-08-11 PROCEDURE — G8427 DOCREV CUR MEDS BY ELIG CLIN: HCPCS | Performed by: NURSE PRACTITIONER

## 2021-08-11 PROCEDURE — 1036F TOBACCO NON-USER: CPT | Performed by: NURSE PRACTITIONER

## 2021-08-11 PROCEDURE — 3017F COLORECTAL CA SCREEN DOC REV: CPT | Performed by: NURSE PRACTITIONER

## 2021-08-11 PROCEDURE — G8420 CALC BMI NORM PARAMETERS: HCPCS | Performed by: NURSE PRACTITIONER

## 2021-08-11 ASSESSMENT — ENCOUNTER SYMPTOMS
COLOR CHANGE: 0
NAUSEA: 0
DIARRHEA: 1
EYE DISCHARGE: 0
RHINORRHEA: 0
COUGH: 0
ABDOMINAL PAIN: 1
EYE REDNESS: 0
BLOOD IN STOOL: 0
ANAL BLEEDING: 0
ABDOMINAL DISTENTION: 0
SORE THROAT: 0
CONSTIPATION: 0
SHORTNESS OF BREATH: 0

## 2021-08-11 NOTE — PROGRESS NOTES
[facility]  [phone] (phone)  [phone] (fax)    Visit Date: 8/11/2021    Alvin Bond is a 45 y.o. female who presents today for:  Chief Complaint   Patient presents with    GI Problem     frequent burping started about 2 years ago off and on     HPI:     Lower abdominal pressure x 2 years - pain when she pushes on the lower abdomin - feels like gas    HPI  Health Maintenance   Topic Date Due    Hepatitis C screen  Never done    DTaP/Tdap/Td vaccine (1 - Tdap) Never done    Cervical cancer screen  Never done    Breast cancer screen  Never done    Colon cancer screen colonoscopy  Never done    Shingles Vaccine (1 of 2) Never done    COVID-19 Vaccine (2 - Pfizer 2-dose series) 05/24/2021    Flu vaccine (1) 09/01/2021    Lipid screen  03/25/2026    HIV screen  Completed    Hepatitis A vaccine  Aged Out    Hepatitis B vaccine  Aged Out    Hib vaccine  Aged Out    Meningococcal (ACWY) vaccine  Aged Out    Pneumococcal 0-64 years Vaccine  Aged Out     Past Medical History:   Diagnosis Date    Kidney stones 2017      History reviewed. No pertinent surgical history. Family History   Problem Relation Age of Onset    No Known Problems Mother     No Known Problems Father     Breast Cancer Sister     Other Brother         brain anuerysm    No Known Problems Sister     High Blood Pressure Sister     No Known Problems Brother     No Known Problems Brother      Social History     Tobacco Use    Smoking status: Never Smoker    Smokeless tobacco: Never Used   Substance Use Topics    Alcohol use: No      Current Outpatient Medications   Medication Sig Dispense Refill    Cholecalciferol (VITAMIN D) 2000 units TABS tablet Take 2 tablets by mouth daily 30 tablet 5     No current facility-administered medications for this visit. No Known Allergies    Subjective:    Review of Systems   Constitutional: Negative for chills, fatigue and fever.    HENT: Negative for congestion, ear pain, postnasal drip, rhinorrhea and sore throat. Eyes: Negative for discharge and redness. Respiratory: Negative for cough and shortness of breath. Cardiovascular: Negative for chest pain and leg swelling. Gastrointestinal: Positive for abdominal pain and diarrhea. Negative for abdominal distention, anal bleeding, blood in stool, constipation and nausea. Skin: Negative for color change and rash. Neurological: Negative for facial asymmetry, speech difficulty and weakness. Hematological: Does not bruise/bleed easily. Psychiatric/Behavioral: Negative for agitation and confusion. Objective:     Vitals:    08/11/21 1315   BP: 112/64   Site: Left Upper Arm   Position: Sitting   Cuff Size: Medium Adult   Pulse: 68   Resp: 16   Temp: 98.9 °F (37.2 °C)   TempSrc: Oral   Weight: 110 lb 12.8 oz (50.3 kg)   Height: 5' (1.524 m)       Body mass index is 21.64 kg/m². Wt Readings from Last 3 Encounters:   08/11/21 110 lb 12.8 oz (50.3 kg)   03/25/21 110 lb 3.2 oz (50 kg)   12/04/19 109 lb 3.2 oz (49.5 kg)     BP Readings from Last 3 Encounters:   08/11/21 112/64   03/25/21 112/76   12/04/19 124/80     Physical Exam  Constitutional:       General: She is not in acute distress. Appearance: She is well-developed. She is not ill-appearing or diaphoretic. HENT:      Head: Normocephalic and atraumatic. Right Ear: Hearing and external ear normal. No decreased hearing noted. Left Ear: Hearing and external ear normal. No decreased hearing noted. Nose: Nose normal. No nasal deformity. Eyes:      General:         Right eye: No discharge. Left eye: No discharge. Conjunctiva/sclera: Conjunctivae normal.   Pulmonary:      Effort: Pulmonary effort is normal. No respiratory distress. Abdominal:      General: There is no distension. Tenderness: There is no guarding. Musculoskeletal:         General: No tenderness or deformity. Normal range of motion.       Cervical back: Normal range of motion and neck supple. Skin:     Coloration: Skin is not pale. Findings: No erythema or rash (On exposed areas). Neurological:      Mental Status: She is alert. Gait: Gait normal.   Psychiatric:         Speech: Speech normal.         Behavior: Behavior normal.         Thought Content: Thought content normal.         Judgment: Judgment normal.         Lab Results   Component Value Date    WBC 7.6 12/01/2019    HGB 14.0 12/01/2019    HCT 42.5 12/01/2019     12/01/2019    CHOL 211 (H) 03/25/2021    TRIG 51 03/25/2021    HDL 77 03/25/2021    LDLDIRECT 153 (H) 08/24/2018    LDLCALC 124 03/25/2021    AST 17 12/01/2019     12/01/2019    K 4.0 12/01/2019     12/01/2019    CREATININE 0.52 (L) 12/01/2019    BUN 13 12/01/2019    CO2 27 12/01/2019    TSH 1.140 03/25/2021    INR 1.03 12/01/2019    LABGLOM >90 09/09/2019    MG 2.2 09/09/2019    CALCIUM 9.50 12/01/2019    VITD25 18 (L) 09/09/2019     Assessment:       Diagnosis Orders   1. Abdominal bloating  TREASURE Saldaña MD, Gastroenterology, [place]    Hepatic Function Panel   2. Diarrhea, unspecified type  TREASURE Saldaña MD, Gastroenterology, [place]    Hepatic Function Panel   3. Colon cancer screening  TREASURE Saldaña MD, Gastroenterology, [place]   4. Breast cancer screening by mammogram  [facility] DIGITAL SCREEN W OR WO CAD BILATERAL   5. Well female exam with routine gynecological exam  [facility] Net[facility] daniel, [address] Gynecology, [place]   6. Encounter for hepatitis C screening test for low risk patient  Hepatitis C Antibody       Plan:   Hilda Shore was seen today for gi problem. Diagnoses and all orders for this visit:    Abdominal bloating  -     TREASURE Saldaña MD, Gastroenterology, [place]  -     Hepatic Function Panel; Future    Diarrhea, unspecified type  -     AFL - Hilda Saldaña MD, Gastroenterology, [facility] [place] [address]  -     Hepatic Function Panel;  Future    Colon cancer screening  -     AFL - Hilda Saldaña MD, Gastroenterology, [address] [place] [address]    Breast cancer screening by mammogram  -     [facility] DIGITAL SCREEN W OR WO CAD BILATERAL; Future    Well female exam with routine gynecological exam  -     Dale Diaz DO, Obstetrics & Gynecology, [address] [facility]    Encounter for hepatitis C screening test for low risk patient  -     Hepatitis C Antibody; Future    Other orders  -     POCT Urinalysis No Micro (Auto)        Return in about 3 months (around 11/11/2021). Orders Placed:  Orders Placed This Encounter   Procedures    [facility] DIGITAL SCREEN W OR WO CAD BILATERAL    Hepatic Function Panel    Hepatitis C Antibody    AFL - Mahin Sandoval MD, Gastroenterology, [place], DO, Obstetrics & Gynecology, [facility] POCT Urinalysis No Micro (Auto)     Medications Prescribed:  No orders of the defined types were placed in this encounter. No future appointments. Patient given educational materials - see patient instructions. Discussed use, benefit, and side effects of prescribedmedications. All patient questions answered. Pt voiced understanding. Reviewed health maintenance. Instructed to continue current medications, diet and exercise. Patient agreed with treatment plan. Follow up as directed.     Electronically signed by DAVID Willis CNP on 8/11/2021 at 4:28 PM

## 2021-08-11 NOTE — PROGRESS NOTES
Health Maintenance Due   Topic Date Due    Hepatitis C screen  Never done    DTaP/Tdap/Td vaccine (1 - Tdap) Never done    Cervical cancer screen  Never done    Breast cancer screen  Never done    Colon cancer screen colonoscopy  Never done    Shingles Vaccine (1 of 2) Never done    COVID-19 Vaccine (2 - Pfizer 2-dose series) 05/24/2021

## 2021-08-11 NOTE — PATIENT INSTRUCTIONS
diarrhea. Some people get diarrhea in response to emotional stress, anxiety, or certain foods. Almost everyone has diarrhea now and then. It usually isn't serious, and your stools will return to normal soon. The important thing to do is replace the fluids you have lost, so you can prevent dehydration. The doctor has checked you carefully, but problems can develop later. If you notice any problems or new symptoms, get medical treatment right away. Follow-up care is a key part of your treatment and safety. Be sure to make and go to all appointments, and call your doctor if you are having problems. It's also a good idea to know your test results and keep a list of the medicines you take. How can you care for yourself at home? · Watch for signs of dehydration, which means your body has lost too much water. Dehydration is a serious condition and should be treated right away. Signs of dehydration are:  ? Increasing thirst and dry eyes and mouth. ? Feeling faint or lightheaded. ? A smaller amount of urine than normal.  · To prevent dehydration, drink plenty of fluids. Choose water and other caffeine-free clear liquids until you feel better. If you have kidney, heart, or liver disease and have to limit fluids, talk with your doctor before you increase the amount of fluids you drink. · Begin eating small amounts of mild foods the next day, if you feel like it. ? Try yogurt that has live cultures of Lactobacillus. (Check the label.)  ? Avoid spicy foods, fruits, alcohol, and caffeine until 48 hours after all symptoms are gone. ? Avoid chewing gum that contains sorbitol. ? Avoid dairy products (except for yogurt with Lactobacillus) while you have diarrhea and for 3 days after symptoms are gone. · The doctor may recommend that you take over-the-counter medicine, such as loperamide (Imodium), if you still have diarrhea after 6 hours. Read and follow all instructions on the label.  Do not use this medicine if you have bloody diarrhea, a high fever, or other signs of serious illness. Call your doctor if you think you are having a problem with your medicine. When should you call for help? Call 911 anytime you think you may need emergency care. For example, call if:    · You passed out (lost consciousness).     · Your stools are maroon or very bloody. Call your doctor now or seek immediate medical care if:    · You are dizzy or lightheaded, or you feel like you may faint.     · Your stools are black and look like tar, or they have streaks of blood.     · You have new or worse belly pain.     · You have symptoms of dehydration, such as:  ? Dry eyes and a dry mouth. ? Passing only a little urine. ? Feeling thirstier than usual.     · You have a new or higher fever. Watch closely for changes in your health, and be sure to contact your doctor if:    · Your diarrhea is getting worse.     · You see pus in the diarrhea.     · You are not getting better after 2 days (48 hours). Where can you learn more? Go to https://Matter and FormpeBrewDog.CivicSolar. org and sign in to your SelStor account. Enter V959 in the [facility] box to learn more about \"Diarrhea: Care Instructions. \"     If you do not have an account, please click on the \"Sign Up Now\" link. Current as of: October 19, 2020               Content Version: 12.9  © 9123-8949 Healthwise, Incorporated. Care instructions adapted under license by [facility] ([facility]). If you have questions about a medical condition or this instruction, always ask your healthcare professional. [place] [address] any warranty or liability for your use of this information. Patient Education        Pelvic Exam: Care Instructions  Overview     When your doctor examines your pelvic organs, it's called a pelvic exam. This exam is done to evaluate symptoms, such as pelvic pain or abnormal vaginal bleeding and discharge.  It may also be done to collect samples of cells for cervical cancer screening. Before your exam, it's important to share some information with your doctor. You can talk about any concerns you may have. Your doctor will also want to know if you are pregnant or use birth control. And your doctor will want to hear about any problems, surgeries, or procedures you have had in your pelvic area. You will also need to tell your doctor when your last period was. Follow-up care is a key part of your treatment and safety. Be sure to make and go to all appointments, and call your doctor if you are having problems. It's also a good idea to know your test results and keep a list of the medicines you take. How is a pelvic exam done? · During a pelvic exam, you will:  ? Take off your clothes below the waist. You will get a paper or cloth cover to put over the lower half of your body. ? Lie on your back on an exam table with your feet and legs supported by footrests. · The doctor may:  ? Ask you to relax your knees. Your knees need to lean out, toward the walls. ? Check the opening of your vagina for sores or swelling. ? Gently put a tool called a speculum into your vagina. It opens the vagina a little bit. You may feel some pressure. The speculum lets your doctor see inside the vagina. ? Use a small brush, spatula, or swab to get a sample for testing. The doctor then removes the speculum. ? Put on gloves and put one or two fingers of one hand into your vagina. The other hand goes on your lower belly. This lets your doctor feel your pelvic organs. You will probably feel some pressure. ? Put one gloved finger into your rectum and one into your vagina, if needed. This can also help check your pelvic organs. You may have a small amount of vaginal discharge or bleeding after the exam.  Why is a pelvic exam done? A pelvic exam may be done:  · To collect samples of cells for cervical cancer screening. · To check for vaginal infection.   · To check for sexually transmitted infections, such as chlamydia or herpes. · To help find the cause of abnormal uterine bleeding. · To look for problems like uterine fibroids, ovarian cysts, or uterine prolapse. · To help find the cause of pelvic or belly pain. · Before inserting an intrauterine device (IUD). · To collect evidence if you've been sexually assaulted. What are the risks of a pelvic exam?  There is a small chance that the doctor will find something on a pelvic exam that would not have caused a problem. This is called overdiagnosis. It could lead to tests or treatment you don't need. When should you call for help? Watch closely for changes in your health, and be sure to contact your doctor if you have any problems. Where can you learn more? Go to https://BioNova.Kintera. org and sign in to your Digital Tech Frontier account. Enter Q195 in the Sonalight box to learn more about \"Pelvic Exam: Care Instructions. \"     If you do not have an account, please click on the \"Sign Up Now\" link. Current as of: February 11, 2021               Content Version: 12.9  © 4218-2334 MyMoneyPlatform. Care instructions adapted under license by [facility] [address] If you have questions about a medical condition or this instruction, always ask your healthcare professional. [place] [address] any warranty or liability for your use of this information. Patient Education        Abdominal Pain: Care Instructions  Your Care Instructions     Abdominal pain has many possible causes. Some aren't serious and get better on their own in a few days. Others need more testing and treatment. If your pain continues or gets worse, you need to be rechecked and may need more tests to find out what is wrong. You may need surgery to correct the problem. Don't ignore new symptoms, such as fever, nausea and vomiting, urination problems, pain that gets worse, and dizziness. These may be signs of a more serious problem.   Your doctor may have recommended a follow-up visit in the next 8 to 12 hours. If you are not getting better, you may need more tests or treatment. The doctor has checked you carefully, but problems can develop later. If you notice any problems or new symptoms, get medical treatment right away. Follow-up care is a key part of your treatment and safety. Be sure to make and go to all appointments, and call your doctor if you are having problems. It's also a good idea to know your test results and keep a list of the medicines you take. How can you care for yourself at home? · Rest until you feel better. · To prevent dehydration, drink plenty of fluids. Choose water and other caffeine-free clear liquids until you feel better. If you have kidney, heart, or liver disease and have to limit fluids, talk with your doctor before you increase the amount of fluids you drink. · If your stomach is upset, eat mild foods, such as rice, dry toast or crackers, bananas, and applesauce. Try eating several small meals instead of two or three large ones. · Wait until 48 hours after all symptoms have gone away before you have spicy foods, alcohol, and drinks that contain caffeine. · Do not eat foods that are high in fat. · Avoid anti-inflammatory medicines such as aspirin, ibuprofen (Advil, Motrin), and naproxen (Aleve). These can cause stomach upset. Talk to your doctor if you take daily aspirin for another health problem. When should you call for help? Call 911 anytime you think you may need emergency care. For example, call if:    · You passed out (lost consciousness).     · You pass maroon or very bloody stools.     · You vomit blood or what looks like coffee grounds.     · You have new, severe belly pain.    Call your doctor now or seek immediate medical care if:    · Your pain gets worse, especially if it becomes focused in one area of your belly.     · You have a new or higher fever.     · Your stools are black and look like tar, or they have streaks of blood.     · You have unexpected vaginal bleeding.     · You have symptoms of a urinary tract infection. These may include:  ? Pain when you urinate. ? Urinating more often than usual.  ? Blood in your urine.     · You are dizzy or lightheaded, or you feel like you may faint. Watch closely for changes in your health, and be sure to contact your doctor if:    · You are not getting better after 1 day (24 hours). Where can you learn more? Go to https://EcastpeValmet Automotive.Pharos Innovations. org and sign in to your Cronote account. Enter E170 in the [facility] box to learn more about \"Abdominal Pain: Care Instructions. \"     If you do not have an account, please click on the \"Sign Up Now\" link. Current as of: October 19, 2020               Content Version: 12.9  © 4666-4342 GreenCloud. Care instructions adapted under license by [facility] ([facility]). If you have questions about a medical condition or this instruction, always ask your healthcare professional. [place] [address] any warranty or liability for your use of this information. Patient Education        Gas and Bloating: Care Instructions  Your Care Instructions     Gas and bloating can be uncomfortable and embarrassing problems. All people pass gas, but some people produce more gas than others, sometimes enough to cause distress. It is normal to pass gas from 6 to 20 times per day. Excess gas usually is not caused by a serious health problem. Gas and bloating usually are caused by something you eat or drink, including some food supplements and medicines. Gas and bloating are usually harmless and go away without treatment. However, changing your diet can help end the problem. Some over-the-counter medicines can help prevent gas and relieve bloating. Follow-up care is a key part of your treatment and safety. Be sure to make and go to all appointments, and call your doctor if you are having problems. It's also a good idea to know your test results and keep a list of the medicines you take. How can you care for yourself at home? · Keep a food diary if you think a food gives you gas. Write down what you eat or drink. Also record when you get gas. If you notice that a food seems to cause your gas each time, avoid it and see if the gas goes away. Examples of foods that cause gas include:  ? Fried and fatty foods. ? Beans. ? Vegetables such as artichokes, asparagus, broccoli, brussels sprouts, cabbage, cauliflower, cucumbers, green peppers, onions, peas, radishes, and raw potatoes. ? Fruits such as apricots, bananas, melons, peaches, pears, prunes, and raw apples. ? Wheat and wheat bran. · Soak dry beans in water overnight, then dump the water and cook the soaked beans in new water. This can help prevent gas and bloating. · If you have problems with lactose, avoid dairy products such as milk and cheese. · Try not to swallow air. Do not drink through a straw, gulp your food, or chew gum. · Take an over-the-counter medicine. Read and follow all instructions on the label. ? Food enzymes, such as Beano, can be added to gas-producing foods to prevent gas. ? Antacids, such as Maalox Anti-Gas and Mylanta Gas, can relieve bloating by making you burp. Be careful when you take over-the-counter antacid medicines. Many of these medicines have aspirin in them. Read the label to make sure that you are not taking more than the recommended dose. Too much aspirin can be harmful. ? Activated charcoal tablets, such as CharcoCaps, may decrease odor from gas you pass. ? If you have problems with lactose, you can take medicines such as Dairy Ease and Lactaid with dairy products to prevent gas and bloating. · Get some exercise regularly. When should you call for help? Call 911 anytime you think you may need emergency care.  For example, call if:    · You have gas and signs of a heart attack, such as:  ? Chest pain or pressure. ? Sweating. ? Shortness of breath. ? Nausea or vomiting. ? Pain that spreads from the chest to the neck, jaw, or one or both shoulders or arms. ? Dizziness or lightheadedness. ? A fast or uneven pulse. After calling 911, chew 1 adult-strength aspirin. Wait for an ambulance. Do not try to drive yourself. Call your doctor now or seek immediate medical care if:    · You have severe belly pain.     · You have blood in your stool. Watch closely for changes in your health, and be sure to contact your doctor if:    · You have blood or pus in your urine.     · Your urine is cloudy or smells bad.     · You are burping and have trouble swallowing.     · You feel bloated and have swelling in your belly.     · You do not get better as expected. Where can you learn more? Go to https://Sonian.MAD Incubator. org and sign in to your [email or web address] account. Enter W257 in the Fanzo box to learn more about \"Gas and Bloating: Care Instructions. \"     If you do not have an account, please click on the \"Sign Up Now\" link. Current as of: October 19, 2020               Content Version: 12.9  © 2006-2021 Healthwise, Incorporated. Care instructions adapted under license by [facility] [email or web address] [facility] ([facility]). If you have questions about a medical condition or this instruction, always ask your healthcare professional. [place] [address] any warranty or liability for your use of this information.

## 2021-08-15 LAB — DHEA UNCONJUGATED: 2.72 NG/ML (ref 0.63–4.7)

## 2021-08-16 ENCOUNTER — TELEPHONE (OUTPATIENT)
Dept: FAMILY MEDICINE CLINIC | Age: 51
End: 2021-08-16

## 2021-08-16 NOTE — TELEPHONE ENCOUNTER
Pt has been terminated from [facility] so she will need referral to a different GI provider. Please advise when new referral is placed so we can send required information to office to schedule.

## 2021-08-17 DIAGNOSIS — R19.7 DIARRHEA, UNSPECIFIED TYPE: ICD-10-CM

## 2021-08-17 DIAGNOSIS — Z12.11 COLON CANCER SCREENING: ICD-10-CM

## 2021-08-17 DIAGNOSIS — R14.0 ABDOMINAL BLOATING: Primary | ICD-10-CM

## 2021-08-17 LAB — DHEAS (DHEA SULFATE): 138 UG/DL (ref 26–200)

## 2021-08-19 ENCOUNTER — TELEPHONE (OUTPATIENT)
Dept: FAMILY MEDICINE CLINIC | Age: 51
End: 2021-08-19

## 2021-08-19 NOTE — TELEPHONE ENCOUNTER
----- Message from Tony Rodarte DO sent at 8/17/2021 11:20 AM EDT -----  These are on th elower side of normal - we could add some dhea at 10mg a day . Albert Bess

## 2022-03-24 ENCOUNTER — OFFICE VISIT (OUTPATIENT)
Dept: FAMILY MEDICINE CLINIC | Age: 52
End: 2022-03-24
Payer: COMMERCIAL

## 2022-03-24 VITALS
HEART RATE: 99 BPM | DIASTOLIC BLOOD PRESSURE: 72 MMHG | OXYGEN SATURATION: 97 % | RESPIRATION RATE: 16 BRPM | TEMPERATURE: 97.7 F | HEIGHT: 60 IN | SYSTOLIC BLOOD PRESSURE: 122 MMHG | BODY MASS INDEX: 21.48 KG/M2 | WEIGHT: 109.4 LBS

## 2022-03-24 DIAGNOSIS — S46.911A MUSCLE STRAIN OF RIGHT SHOULDER REGION, INITIAL ENCOUNTER: Primary | ICD-10-CM

## 2022-03-24 PROCEDURE — G8420 CALC BMI NORM PARAMETERS: HCPCS | Performed by: STUDENT IN AN ORGANIZED HEALTH CARE EDUCATION/TRAINING PROGRAM

## 2022-03-24 PROCEDURE — 99213 OFFICE O/P EST LOW 20 MIN: CPT | Performed by: STUDENT IN AN ORGANIZED HEALTH CARE EDUCATION/TRAINING PROGRAM

## 2022-03-24 PROCEDURE — G8484 FLU IMMUNIZE NO ADMIN: HCPCS | Performed by: STUDENT IN AN ORGANIZED HEALTH CARE EDUCATION/TRAINING PROGRAM

## 2022-03-24 PROCEDURE — 3017F COLORECTAL CA SCREEN DOC REV: CPT | Performed by: STUDENT IN AN ORGANIZED HEALTH CARE EDUCATION/TRAINING PROGRAM

## 2022-03-24 PROCEDURE — G8427 DOCREV CUR MEDS BY ELIG CLIN: HCPCS | Performed by: STUDENT IN AN ORGANIZED HEALTH CARE EDUCATION/TRAINING PROGRAM

## 2022-03-24 PROCEDURE — 1036F TOBACCO NON-USER: CPT | Performed by: STUDENT IN AN ORGANIZED HEALTH CARE EDUCATION/TRAINING PROGRAM

## 2022-03-24 RX ORDER — CYCLOBENZAPRINE HCL 5 MG
5 TABLET ORAL 3 TIMES DAILY PRN
Qty: 30 TABLET | Refills: 0 | Status: SHIPPED | OUTPATIENT
Start: 2022-03-24 | End: 2022-04-03

## 2022-03-24 NOTE — PATIENT INSTRUCTIONS
Heat compress for 20 min in morning  Ice/cool compress for 20 min in evening  Flexeril three times a day for 10 days  Advil as needed with food Epidermal Closure: simple interrupted

## 2022-03-24 NOTE — PROGRESS NOTES
Health Maintenance Due   Topic Date Due    DTaP/Tdap/Td vaccine (1 - Tdap) Never done    Cervical cancer screen  Never done    Breast cancer screen  Never done    Shingles Vaccine (1 of 2) Never done    COVID-19 Vaccine (2 - Pfizer 3-dose series) 05/24/2021    Flu vaccine (1) Never done    Depression Screen  03/25/2022

## 2022-03-24 NOTE — PROGRESS NOTES
[id number] [facility] [address] [address]  Dept: [phone]  Loc: Abebe Whittaker (:  1970) is a 45 y.o. female,Established patient, here for evaluation of the following chief complaint(s):  Shoulder Pain (right side)      ASSESSMENT/PLAN:  1. Muscle strain of right shoulder region, initial encounter  -     cyclobenzaprine (FLEXERIL) 5 MG tablet; Take 1 tablet by mouth 3 times daily as needed for Muscle spasms, Disp-30 tablet, R-0Normal  Heat compress in morning for 20 min on 1 hour off, repeat as needed. Ice pack at night 20 min 1 hour off, repeat as needed. Advil q6hr as needed with food  Flexoril TID   Will consider PT    Return if symptoms worsen or fail to improve. SUBJECTIVE/OBJECTIVE:  Presents with right shoulder pain, \"mostly muscle above shoulder blade. \"  Experienced for years, intermittent. Does a lot of lifting at work in [employer]. Tried advil today one hour prior to appointment. Massage helps. ROM normal    Physical Exam:  General appearance: No apparent distress, appears stated age and cooperative. Neck: Supple, with full range of motion. No jugular venous distention. Trachea midline. Respiratory:  Normal respiratory effort. Clear to auscultation, bilaterally without Rales/Wheezes/Rhonchi. Cardiovascular: Regular rate and rhythm with normal S1/S2 without murmurs, rubs or gallops. Abdomen: Soft, non-tender, non-distended with normal bowel sounds. Musculoskeletal: Right and left shoulder normal ROM, non tender to palpation of shoulder girdle. Mild tenderness reproducible of right suprascapular trapezius muscle body on palpation.       Vitals:    22 1414   BP: 122/72   Site: Right Upper Arm   Position: Sitting   Cuff Size: Medium Adult   Pulse: 99   Resp: 16   Temp: 97.7 °F (36.5 °C)   TempSrc: Oral   SpO2: 97%   Weight: 109 lb 6.4 oz (49.6 kg)   Height: 5' (1.524 m)       An electronic signature was used to authenticate this note.     --Immanuel Boyle MD

## 2022-03-24 NOTE — PROGRESS NOTES
April Gregory is a 45 y. o.female    Chief Complaint   Patient presents with    Shoulder Pain     right side       Chief complaint, [place], and all pertinent details of the case reviewed with the resident. Please see resident's note for specific details discussed at today's visit. Patient Active Problem List   Diagnosis    Vitamin D deficiency    Mixed hyperlipidemia    Allergic rhinitis    Pelvic pain    Pain of right lower extremity    Muscle cramp    Menopausal symptoms       Current Outpatient Medications   Medication Sig Dispense Refill    Nutritional Supplements (DHEA PO) Take by mouth      Ibuprofen (ADVIL PO) Take by mouth as needed      cyclobenzaprine (FLEXERIL) 5 MG tablet Take 1 tablet by mouth 3 times daily as needed for Muscle spasms 30 tablet 0    Cholecalciferol (VITAMIN D) 2000 units TABS tablet Take 2 tablets by mouth daily 30 tablet 5     No current facility-administered medications for this visit. Review of Systems per Dr. Rodrigo Duran     /72 (Site: Right Upper Arm, Position: Sitting, Cuff Size: Medium Adult)   Pulse 99   Temp 97.7 °F (36.5 °C) (Oral)   Resp 16   Ht 5' (1.524 m)   Wt 109 lb 6.4 oz (49.6 kg)   SpO2 97%   BMI 21.37 kg/m²   BP Readings from Last 3 Encounters:   03/24/22 122/72   08/11/21 112/64   03/25/21 112/76       Wt Readings from Last 3 Encounters:   03/24/22 109 lb 6.4 oz (49.6 kg)   08/11/21 110 lb 12.8 oz (50.3 kg)   03/25/21 110 lb 3.2 oz (50 kg)     Body mass index is 21.37 kg/m². Physical Exam per Dr. Sue Banks    No results found for this visit on 03/24/22.     No results found for: LABA1C    Lab Results   Component Value Date    CHOL 211 (H) 03/25/2021    TRIG 51 03/25/2021    HDL 77 03/25/2021    LDLCALC 124 03/25/2021    LDLDIRECT 153 (H) 08/24/2018       The ASCVD Risk score (Celena Scruggs, et al., 2013) failed to calculate for the following reasons:    Unable to determine if patient is Non-  American    Lab Results   Component Value Date     08/11/2021    K 4.0 08/11/2021     08/11/2021    CO2 27 08/11/2021    BUN 13 08/11/2021    CREATININE 0.5 08/11/2021    GLUCOSE 95 08/11/2021    CALCIUM 10.4 08/11/2021    PROT 7.5 08/11/2021    LABALBU 4.9 08/11/2021    BILITOT 0.9 08/11/2021    ALKPHOS 104 08/11/2021    AST 22 08/11/2021    ALT 19 08/11/2021    LABGLOM >90 08/11/2021    AGRATIO 1.5 12/01/2019       No results found for: LABMICR, [id number]    Lab Results   Component Value Date    TSH 1.140 03/25/2021    E2GNGTL 11.4 08/24/2018    T4FREE 1.42 12/07/2015       Lab Results   Component Value Date    WBC 4.8 08/11/2021    HGB 13.8 08/11/2021    HCT 43.4 08/11/2021    MCV 97.1 08/11/2021     08/11/2021         Immunization History   Administered Date(s) Administered    COVID-19, Pfizer Purple top, DILUTE for use, 12+ yrs, 30mcg/0.3mL dose 05/03/2021       Health Maintenance   Topic Date Due    DTaP/Tdap/Td vaccine (1 - Tdap) Never done    Cervical cancer screen  Never done    Breast cancer screen  Never done    Shingles Vaccine (1 of 2) Never done    COVID-19 Vaccine (2 - Pfizer 3-dose series) 05/24/2021    Flu vaccine (1) Never done    Depression Screen  03/25/2022    Lipid screen  03/25/2026    Colorectal Cancer Screen  09/20/2031    Hepatitis C screen  Completed    HIV screen  Completed    Hepatitis A vaccine  Aged Out    Hepatitis B vaccine  Aged Out    Hib vaccine  Aged Out    Meningococcal (ACWY) vaccine  Aged Out    Pneumococcal 0-64 years Vaccine  Aged Out           No future appointments. ASSESSMENT       Diagnosis Orders   1.  Muscle strain of right shoulder region, initial encounter  cyclobenzaprine (FLEXERIL) 5 MG tablet       PLAN      After discussion with Dr. Ramona Mcburney, we agreed on plan as follows:    Continue with local massage  Ice in p.m./heat in a.m.-20 minutes on, 1 hour off  Okay for ibuprofen over-the-counter as directed on bottle  Encourage PT  Flexeril 5 mg- 1 pill 3 times daily as needed muscle spasm (#21/no refills)  Further management pending response to above treatment. Attending Physician Statement  I have discussed the case, including pertinent history and exam findings with the resident. I agree with the documented assessment and plan as documented by the resident. GE modifier added  to this encounter     Electronically signed by Lisa Escudero MD on 3/24/2022 at 6:02 PM     Controlled Substance Monitoring:    Acute and Chronic Pain Monitoring:   RX Monitoring 3/24/2022   Periodic Controlled Substance Monitoring No signs of potential drug abuse or diversion identified.

## 2022-04-29 ENCOUNTER — TELEPHONE (OUTPATIENT)
Dept: FAMILY MEDICINE CLINIC | Age: 52
End: 2022-04-29

## 2022-04-29 NOTE — TELEPHONE ENCOUNTER
----- Message from [facility] sent at 4/28/2022 10:30 AM EDT -----  Subject: Appointment Request    Reason for Call: Routine Physical Exam    QUESTIONS  Type of Appointment? Established Patient  Reason for appointment request? No appointments available during search  Additional Information for Provider? pt called and would like to schedule   an apt. for annual physical. She only wants to see Dr. Moises Jara. There are no   available apts. ---------------------------------------------------------------------------  --------------  [employer]  What is the best way for the office to contact you? OK to leave message on   voicemail  Preferred Call Back Phone Number? [phone]  ---------------------------------------------------------------------------  --------------  SCRIPT ANSWERS  Relationship to Patient? Self  (If the patient has Medicare as their primary insurance coverage ask this   question) Are you requesting a Medicare Annual Wellness Visit? No  (Is the patient requesting a pap smear with their physical exam?)? No  (Is the patient requesting their annual physical and does not need PAP or   AWV per above?)? Yes   Have you been diagnosed with, awaiting test results for, or told that you   are suspected of having COVID-19 (Coronavirus)? (If patient has tested   negative or was tested as a requirement for work, school, or travel and   not based on symptoms, answer no)? No  Within the past 10 days have you developed any of the following symptoms   (answer no if symptoms have been present longer than 10 days or began   more than 10 days ago)? Fever or Chills, Cough, Shortness of breath or   difficulty breathing, Loss of taste or smell, Sore throat, Nasal   congestion, Sneezing or runny nose, Fatigue or generalized body aches   (answer no if pain is specific to a body part e.g. back pain), Diarrhea,   Headache? No  Have you had close contact with someone with COVID-19 in the last 7 days?    No  (Service Expert  click yes below to proceed with [employer] As Usual   Scheduling)?  Yes

## 2022-05-04 NOTE — TELEPHONE ENCOUNTER
Spoke with patient and tried to schedule her with resitdent.  She refused and states she will only see Dr Curly Smith

## 2022-07-05 ENCOUNTER — HOSPITAL ENCOUNTER (OUTPATIENT)
Dept: WOMENS IMAGING | Age: 52
Discharge: HOME | End: 2022-07-05
Payer: COMMERCIAL

## 2022-07-05 DIAGNOSIS — Z12.31 BREAST CANCER SCREENING BY MAMMOGRAM: ICD-10-CM

## 2022-07-05 PROCEDURE — 77063 BREAST TOMOSYNTHESIS BI: CPT

## 2022-08-30 ENCOUNTER — OFFICE VISIT (OUTPATIENT)
Dept: FAMILY MEDICINE CLINIC | Age: 52
End: 2022-08-30
Payer: COMMERCIAL

## 2022-08-30 VITALS
HEART RATE: 71 BPM | RESPIRATION RATE: 12 BRPM | BODY MASS INDEX: 21.55 KG/M2 | OXYGEN SATURATION: 98 % | HEIGHT: 60 IN | WEIGHT: 109.8 LBS | DIASTOLIC BLOOD PRESSURE: 82 MMHG | SYSTOLIC BLOOD PRESSURE: 124 MMHG | TEMPERATURE: 96.6 F

## 2022-08-30 DIAGNOSIS — R79.89 LOW VITAMIN D LEVEL: ICD-10-CM

## 2022-08-30 DIAGNOSIS — R20.2 NUMBNESS AND TINGLING IN BOTH HANDS: ICD-10-CM

## 2022-08-30 DIAGNOSIS — K21.9 GASTROESOPHAGEAL REFLUX DISEASE, UNSPECIFIED WHETHER ESOPHAGITIS PRESENT: ICD-10-CM

## 2022-08-30 DIAGNOSIS — E78.00 HYPERCHOLESTEREMIA: ICD-10-CM

## 2022-08-30 DIAGNOSIS — R14.2 BELCHING: ICD-10-CM

## 2022-08-30 DIAGNOSIS — R20.0 NUMBNESS AND TINGLING IN BOTH HANDS: ICD-10-CM

## 2022-08-30 DIAGNOSIS — R53.83 FATIGUE, UNSPECIFIED TYPE: ICD-10-CM

## 2022-08-30 PROCEDURE — 99214 OFFICE O/P EST MOD 30 MIN: CPT | Performed by: STUDENT IN AN ORGANIZED HEALTH CARE EDUCATION/TRAINING PROGRAM

## 2022-08-30 PROCEDURE — 3017F COLORECTAL CA SCREEN DOC REV: CPT | Performed by: STUDENT IN AN ORGANIZED HEALTH CARE EDUCATION/TRAINING PROGRAM

## 2022-08-30 PROCEDURE — G8420 CALC BMI NORM PARAMETERS: HCPCS | Performed by: STUDENT IN AN ORGANIZED HEALTH CARE EDUCATION/TRAINING PROGRAM

## 2022-08-30 PROCEDURE — 1036F TOBACCO NON-USER: CPT | Performed by: STUDENT IN AN ORGANIZED HEALTH CARE EDUCATION/TRAINING PROGRAM

## 2022-08-30 PROCEDURE — G8427 DOCREV CUR MEDS BY ELIG CLIN: HCPCS | Performed by: STUDENT IN AN ORGANIZED HEALTH CARE EDUCATION/TRAINING PROGRAM

## 2022-08-30 RX ORDER — PANTOPRAZOLE SODIUM 40 MG/1
40 TABLET, DELAYED RELEASE ORAL
Qty: 30 TABLET | Refills: 0 | Status: SHIPPED | OUTPATIENT
Start: 2022-08-30

## 2022-08-30 RX ORDER — SUCRALFATE 1 G/1
1 TABLET ORAL 4 TIMES DAILY
Qty: 120 TABLET | Refills: 3 | Status: SHIPPED | OUTPATIENT
Start: 2022-08-30

## 2022-08-30 SDOH — ECONOMIC STABILITY: FOOD INSECURITY: WITHIN THE PAST 12 MONTHS, YOU WORRIED THAT YOUR FOOD WOULD RUN OUT BEFORE YOU GOT MONEY TO BUY MORE.: SOMETIMES TRUE

## 2022-08-30 SDOH — ECONOMIC STABILITY: FOOD INSECURITY: WITHIN THE PAST 12 MONTHS, THE FOOD YOU BOUGHT JUST DIDN'T LAST AND YOU DIDN'T HAVE MONEY TO GET MORE.: NEVER TRUE

## 2022-08-30 ASSESSMENT — ENCOUNTER SYMPTOMS
RHINORRHEA: 0
COUGH: 0
ABDOMINAL DISTENTION: 0
ABDOMINAL PAIN: 0
SORE THROAT: 0
WHEEZING: 0
CONSTIPATION: 0
VOMITING: 0
SINUS PRESSURE: 0
CHOKING: 0
SINUS PAIN: 0
TROUBLE SWALLOWING: 1
NAUSEA: 0
CHEST TIGHTNESS: 0
DIARRHEA: 0
SHORTNESS OF BREATH: 0

## 2022-08-30 ASSESSMENT — PATIENT HEALTH QUESTIONNAIRE - PHQ9
SUM OF ALL RESPONSES TO PHQ QUESTIONS 1-9: 0
SUM OF ALL RESPONSES TO PHQ QUESTIONS 1-9: 0
2. FEELING DOWN, DEPRESSED OR HOPELESS: 0
SUM OF ALL RESPONSES TO PHQ9 QUESTIONS 1 & 2: 0
SUM OF ALL RESPONSES TO PHQ QUESTIONS 1-9: 0
SUM OF ALL RESPONSES TO PHQ QUESTIONS 1-9: 0
1. LITTLE INTEREST OR PLEASURE IN DOING THINGS: 0

## 2022-08-30 ASSESSMENT — SOCIAL DETERMINANTS OF HEALTH (SDOH): HOW HARD IS IT FOR YOU TO PAY FOR THE VERY BASICS LIKE FOOD, HOUSING, MEDICAL CARE, AND HEATING?: NOT VERY HARD

## 2022-08-30 NOTE — PROGRESS NOTES
Aleksandra Gee (:  1970) is a 47 y.o. female,Established patient, here for evaluation of the following chief complaint(s):  Follow-up (Would like an order for EGD due to Colonoscopy 2021 did not show any concerns)         ASSESSMENT/PLAN:  1. Gastroesophageal reflux disease, unspecified whether esophagitis present  -     CBC with Auto Differential; Future  -     Comprehensive Metabolic Panel; Future  -     pantoprazole (PROTONIX) 40 MG tablet; Take 1 tablet by mouth every morning (before breakfast), Disp-30 tablet, R-0Normal  -     TREASURE Moran MD, [facility], [place]  -     sucralfate (CARAFATE) 1 GM tablet; Take 1 tablet by mouth 4 times daily, Disp-120 tablet, R-3Normal  2. Belching  -     CBC with Auto Differential; Future  -     Comprehensive Metabolic Panel; Future  -     pantoprazole (PROTONIX) 40 MG tablet; Take 1 tablet by mouth every morning (before breakfast), Disp-30 tablet, R-0Normal  -     TREASURE Moran MD, [facility], [place]  -     sucralfate (CARAFATE) 1 GM tablet; Take 1 tablet by mouth 4 times daily, Disp-120 tablet, R-3Normal  3. Fatigue, unspecified type  -     CBC with Auto Differential; Future  -     Comprehensive Metabolic Panel; Future  4. Numbness and tingling in both hands  -     Vitamin B12 & Folate; Future  5. Low vitamin D level  -     Vitamin D 25 Hydroxy; Future  6. Hypercholesteremia  -     TSH; Future  -     T4, Free; Future  -     Lipid, Fasting; Future    Will get labs as above and follow up in 4 weeks to go over results. For numbness and tingling, suspect carpal tunnel, will give exercises at this time, can consider night bracing. Due to GERD hx without EGD, will refer to GI at this time, previous Dr. González Quevedo patient, colonoscopy normal 2021  Will start protonix 40mg every morning and carafate 1mg with each meal and before bed. [address] [address] for as needed ibuprofen/tylenol for neck/muscle pain.    Ok to continue melatonin for sleep, bring in all supplements to next visit. Return in about 1 month (around 9/30/2022) for follow up, well adult, go over lab results. Subjective   SUBJECTIVE/OBJECTIVE:  HPI  Is having a lot of burping. Feels worse after eating food. Gets indigestion/trouble swallowing solids at time. Doesn't associate with particular types of food. No belly pain. No n/v/c/d. No blood in stools. Better if she eats slow. If she eats fast, feels like food gets stuck. Has tried to alter diet, no pineapple/tomato/etc.    Did have colonoscopy 9/2021 and they said no issues. Is having back pain as well feels like something is tight. Does get numbness in fingers during sleep. Left hand is less numb than right. Now almost every night. Additionally having issues with sleep. Doesn't sleep well since menapause. Feels like she can take a nap at any time. Still having some issues with neck soreness especially after long days of work. Review of Systems   Constitutional:  Positive for fatigue. Negative for appetite change, chills, fever and unexpected weight change. HENT:  Positive for trouble swallowing. Negative for congestion, postnasal drip, rhinorrhea, sinus pressure, sinus pain and sore throat. Respiratory:  Negative for cough, choking, chest tightness, shortness of breath and wheezing. Cardiovascular:  Negative for chest pain, palpitations and leg swelling. Gastrointestinal:  Negative for abdominal distention (belching), abdominal pain, constipation, diarrhea, nausea and vomiting. Genitourinary:  Negative for dysuria, frequency and urgency. Musculoskeletal:  Positive for myalgias, neck pain and neck stiffness. Skin:  Negative for rash. Neurological:  Positive for numbness (numbness/tingling bilateral hands). Psychiatric/Behavioral:  Positive for sleep disturbance. Negative for decreased concentration. The patient is not nervous/anxious.          Objective   Vitals:    08/30/22 1003   BP: 124/82   Site: Left Upper Arm   Position: Sitting   Cuff Size: Medium Adult   Pulse: 71   Resp: 12   Temp: (!) 96.6 °F (35.9 °C)   TempSrc: Temporal   SpO2: 98%   Weight: 109 lb 12.8 oz (49.8 kg)   Height: 5' (1.524 m)      Physical Exam  Constitutional:       General: She is not in acute distress. Appearance: Normal appearance. She is not ill-appearing. HENT:      Head: Normocephalic and atraumatic. Nose: Nose normal.      Mouth/Throat:      Mouth: Mucous membranes are moist.      Pharynx: Oropharynx is clear. Eyes:      Extraocular Movements: Extraocular movements intact. Conjunctiva/sclera: Conjunctivae normal.      Pupils: Pupils are equal, round, and reactive to light. Cardiovascular:      Rate and Rhythm: Normal rate and regular rhythm. Pulses: Normal pulses. Heart sounds: Normal heart sounds. No murmur heard. Pulmonary:      Effort: Pulmonary effort is normal. No respiratory distress. Breath sounds: Normal breath sounds. No wheezing. Abdominal:      General: Abdomen is flat. Bowel sounds are normal. There is no distension. Palpations: Abdomen is soft. There is no mass. Tenderness: There is no abdominal tenderness. There is no guarding or rebound. Hernia: No hernia is present. Musculoskeletal:         General: No tenderness. Normal range of motion. Cervical back: Normal range of motion and neck supple. No tenderness. Skin:     General: Skin is warm. Neurological:      General: No focal deficit present. Mental Status: She is alert. Mental status is at baseline. Psychiatric:         Mood and Affect: Mood normal.              An electronic signature was used to authenticate this note.     --Tia Pepe MD

## 2022-08-30 NOTE — PATIENT INSTRUCTIONS
Thank you   Thank you for trusting us with your healthcare needs. You may receive a survey regarding today's visit. It would help us out if you would take a few moments to provide your feedback. We value your input. Please bring in ALL medications BOTTLES, including inhalers, herbal supplements, over the counter, prescribed & non-prescribed medicine. The office would like actual medication bottles and a list.   Please note our OFFICE POLICIES:   Prior to getting your labs drawn, please check with your insurance company for benefits and eligibility of lab services. Often, insurance companies cover certain tests for preventative visits only. It is patient's responsibility to see what is covered. We are unable to change a diagnosis after the test has been performed. Lab orders will not be re-printed. Please hold onto your original lab orders and take them to your lab to be completed. If you no show your scheduled appointment three times, you will be dismissed from this practice. Reschedules must be completed 24 hours prior to your schedule appointment. If the list below has been completed, PLEASE FAX RECORDS TO OUR OFFICE @ [phone].  Once the records have been received we will update your records at our office:  Health Maintenance Due   Topic Date Due    DTaP/Tdap/Td vaccine (1 - Tdap) Never done    Cervical cancer screen  Never done    Shingles vaccine (1 of 2) Never done    COVID-19 Vaccine (2 - Pfizer series) 05/24/2021    Depression Screen  03/25/2022

## 2022-08-30 NOTE — PROGRESS NOTES
S: 47 y.o. female with   Chief Complaint   Patient presents with    Follow-up     Would like an order for EGD due to Colonoscopy 09/2021 did not show any concerns       Chief complaint, [place], and all pertinent details of the case reviewed with the resident. Please see resident's note for specific details discussed at today's visit. Works hard in [address] [place] [address] [facility]    BP Readings from Last 3 Encounters:   08/30/22 124/82   03/24/22 122/72   08/11/21 112/64     Wt Readings from Last 3 Encounters:   08/30/22 109 lb 12.8 oz (49.8 kg)   03/24/22 109 lb 6.4 oz (49.6 kg)   08/11/21 110 lb 12.8 oz (50.3 kg)       O: VS:  height is 5' (1.524 m) and weight is 109 lb 12.8 oz (49.8 kg). Her temporal temperature is 96.6 °F (35.9 °C) (abnormal). Her blood pressure is 124/82 and her pulse is 71. Her respiration is 12 and oxygen saturation is 98%. AAO/NAD, appropriate affect for mood       Diagnosis Orders   1. Gastroesophageal reflux disease, unspecified whether esophagitis present  CBC with Auto Differential    Comprehensive Metabolic Panel    pantoprazole (PROTONIX) 40 MG tablet    TREASURE Shea MD, Gastro[facility], [place]    sucralfate (CARAFATE) 1 GM tablet      2. Belching  CBC with Auto Differential    Comprehensive Metabolic Panel    pantoprazole (PROTONIX) 40 MG tablet    TREASURE Shea MD, [facility], [place]    sucralfate (CARAFATE) 1 GM tablet      3. Fatigue, unspecified type  CBC with Auto Differential    Comprehensive Metabolic Panel      4. Numbness and tingling in both hands  Vitamin B12 & Folate      5. Low vitamin D level  Vitamin D 25 Hydroxy      6.  Hypercholesteremia  TSH    T4, Free    Lipid, Fasting          Plan:  Tingling in hands at night- vit b 12 and folate, exercise, awareness of hands at rest - avoiding palmar flexion  Gi referral and protonix and carafate for [facility] GI complaints of indigestion, belching  Screen for lipids, cmp, tsh,t4, cbc  Can continue melatonin prn- bring in next time to know strength - can take up to 10 mg, can try otc combinations with melatonin  F/u in 1 month to review labs and reevaluate hand and sleep and gi complaints    Health Maintenance Due   Topic Date Due    DTaP/Tdap/Td vaccine (1 - Tdap) Never done    Cervical cancer screen  Never done    Shingles vaccine (1 of 2) Never done    COVID-19 Vaccine (2 - Pfizer series) 05/24/2021    Depression Screen  03/25/2022       Attending Physician Statement  I have discussed the case, including pertinent history and exam findings with the resident. I also have seen the patient and performed key portions of the examination. I agree with the documented assessment and plan as documented by the resident.         Eileen Rodriguez MD 8/30/2022 11:02 AM

## 2022-08-30 NOTE — PROGRESS NOTES
Health Maintenance Due   Topic Date Due    DTaP/Tdap/Td vaccine (1 - Tdap) Never done    Cervical cancer screen  Never done    Shingles vaccine (1 of 2) Never done    COVID-19 Vaccine (2 - Pfizer series) 05/24/2021    Depression Screen  03/25/2022

## 2022-12-07 NOTE — PATIENT INSTRUCTIONS
Outreach attempt was made to schedule a Medicare Wellness Visit. This was the first attempt. Contact was made, MWV appointment refused.   Patient Education        Hemorrhoids: Care Instructions  Your Care Instructions    Hemorrhoids are enlarged veins that develop in the anal canal. Bleeding during bowel movements, itching, swelling, and rectal pain are the most common symptoms. They can be uncomfortable at times, but hemorrhoids rarely are a serious problem. You can treat most hemorrhoids with simple changes to your diet and bowel habits. These changes include eating more fiber and not straining to pass stools. Most hemorrhoids do not need surgery or other treatment unless they are very large and painful or bleed a lot. Follow-up care is a key part of your treatment and safety. Be sure to make and go to all appointments, and call your doctor if you are having problems. It's also a good idea to know your test results and keep a list of the medicines you take. How can you care for yourself at home? · Sit in a few inches of warm water (sitz bath) 3 times a day and after bowel movements. The warm water helps with pain and itching. · Put ice on your anal area several times a day for 10 minutes at a time. Put a thin cloth between the ice and your skin. Follow this by placing a warm, wet towel on the area for another 10 to 20 minutes. · Take pain medicines exactly as directed. ? If the doctor gave you a prescription medicine for pain, take it as prescribed. ? If you are not taking a prescription pain medicine, ask your doctor if you can take an over-the-counter medicine. · Keep the anal area clean, but be gentle. Use water and a fragrance-free soap, such as [place], or use baby wipes or medicated pads, such as Tucks. · Wear cotton underwear and loose clothing to decrease moisture in the anal area. · Eat more fiber. Include foods such as whole-grain breads and cereals, raw vegetables, raw and dried fruits, and beans. · Drink plenty of fluids, enough so that your urine is light yellow or clear like water.  If you have kidney, heart, or liver try some yogurt - to help the GI health and help with the burping - can also try some prilosec OTC daily to see if that helps    Will see gyn    She does not have insurance - so will look into the memmogram    Will try to take the vit D most days    Will give some steroid foam for the hemmorrhoid    Will see you back in 6 months to ask about the vitamin d and the hemmorroid and also recheck the cholesterol and the abdominal pain    Come back in the next month if the pain is not better    Can look into [facility]

## 2023-07-12 ENCOUNTER — HOSPITAL ENCOUNTER (EMERGENCY)
Age: 53
Discharge: HOME | End: 2023-07-12

## 2023-07-12 VITALS
DIASTOLIC BLOOD PRESSURE: 77 MMHG | WEIGHT: 105 LBS | OXYGEN SATURATION: 98 % | RESPIRATION RATE: 16 BRPM | TEMPERATURE: 98 F | SYSTOLIC BLOOD PRESSURE: 150 MMHG | HEIGHT: 61 IN | BODY MASS INDEX: 19.83 KG/M2 | HEART RATE: 75 BPM

## 2023-07-12 DIAGNOSIS — R19.7 DIARRHEA OF PRESUMED INFECTIOUS ORIGIN: Primary | ICD-10-CM

## 2023-07-12 PROCEDURE — 99213 OFFICE O/P EST LOW 20 MIN: CPT

## 2023-07-12 PROCEDURE — 99203 OFFICE O/P NEW LOW 30 MIN: CPT | Performed by: NURSE PRACTITIONER

## 2023-07-12 RX ORDER — LOPERAMIDE HYDROCHLORIDE 2 MG/1
2 CAPSULE ORAL 4 TIMES DAILY PRN
Qty: 20 CAPSULE | Refills: 0 | Status: SHIPPED | OUTPATIENT
Start: 2023-07-12 | End: 2023-07-19

## 2023-07-12 ASSESSMENT — ENCOUNTER SYMPTOMS
NAUSEA: 0
DIARRHEA: 1
ABDOMINAL PAIN: 0
SHORTNESS OF BREATH: 0
VOMITING: 0
SORE THROAT: 0

## 2023-07-12 ASSESSMENT — PAIN - FUNCTIONAL ASSESSMENT: PAIN_FUNCTIONAL_ASSESSMENT: NONE - DENIES PAIN

## 2023-07-13 ENCOUNTER — OFFICE VISIT (OUTPATIENT)
Dept: FAMILY MEDICINE CLINIC | Age: 53
End: 2023-07-13

## 2023-07-13 VITALS
TEMPERATURE: 97.5 F | HEIGHT: 61 IN | BODY MASS INDEX: 21.34 KG/M2 | HEART RATE: 80 BPM | RESPIRATION RATE: 12 BRPM | SYSTOLIC BLOOD PRESSURE: 138 MMHG | WEIGHT: 113 LBS | DIASTOLIC BLOOD PRESSURE: 72 MMHG | OXYGEN SATURATION: 99 %

## 2023-07-13 DIAGNOSIS — Z76.89 ENCOUNTER TO ESTABLISH CARE: ICD-10-CM

## 2023-07-13 DIAGNOSIS — A08.4 VIRAL GASTROENTERITIS: Primary | ICD-10-CM

## 2023-07-13 SDOH — ECONOMIC STABILITY: HOUSING INSECURITY
IN THE LAST 12 MONTHS, WAS THERE A TIME WHEN YOU DID NOT HAVE A STEADY PLACE TO SLEEP OR SLEPT IN A SHELTER (INCLUDING NOW)?: NO

## 2023-07-13 SDOH — ECONOMIC STABILITY: FOOD INSECURITY: WITHIN THE PAST 12 MONTHS, THE FOOD YOU BOUGHT JUST DIDN'T LAST AND YOU DIDN'T HAVE MONEY TO GET MORE.: NEVER TRUE

## 2023-07-13 SDOH — ECONOMIC STABILITY: INCOME INSECURITY: HOW HARD IS IT FOR YOU TO PAY FOR THE VERY BASICS LIKE FOOD, HOUSING, MEDICAL CARE, AND HEATING?: NOT VERY HARD

## 2023-07-13 SDOH — ECONOMIC STABILITY: FOOD INSECURITY: WITHIN THE PAST 12 MONTHS, YOU WORRIED THAT YOUR FOOD WOULD RUN OUT BEFORE YOU GOT MONEY TO BUY MORE.: NEVER TRUE

## 2023-07-13 ASSESSMENT — ENCOUNTER SYMPTOMS
RHINORRHEA: 0
DIARRHEA: 0
COUGH: 0
SHORTNESS OF BREATH: 0
ABDOMINAL PAIN: 0
VOMITING: 0
NAUSEA: 0
BLOOD IN STOOL: 0

## 2023-07-13 ASSESSMENT — PATIENT HEALTH QUESTIONNAIRE - PHQ9
SUM OF ALL RESPONSES TO PHQ9 QUESTIONS 1 & 2: 0
SUM OF ALL RESPONSES TO PHQ QUESTIONS 1-9: 0
SUM OF ALL RESPONSES TO PHQ QUESTIONS 1-9: 0
2. FEELING DOWN, DEPRESSED OR HOPELESS: 0
1. LITTLE INTEREST OR PLEASURE IN DOING THINGS: 0
SUM OF ALL RESPONSES TO PHQ QUESTIONS 1-9: 0
SUM OF ALL RESPONSES TO PHQ QUESTIONS 1-9: 0

## 2023-07-13 NOTE — PROGRESS NOTES
[address] [facility] [address] [facility] [address]  [address] [address]  Dept: [phone]  Dept Fax: [phone]  Loc: [phone]    HPI:     Christa Marie is a 46 y.o. female who presents today for:  Chief Complaint   Patient presents with    ED Follow-up     Pt presents for an UC f/u got abd pain and diarrhea. Pt states she is doing better, and does not have the problem today. Patient here for UC follow up. Pt reports abdominal pain and watery diarrhea for 2 days on 7/10-7/11, had 6 episodes of diarrhea. Last time she had diarrhea was 2 days ago 7/10. Feels a little tired. Reports she can hear bowel sounds. Today she ate some noodles. No fever or chills. No NV. Feeling better. UC said stomach flu or bad food. She cooks at home. Health Maintenance Topics with due status: Overdue       Topic Date Due    DTaP/Tdap/Td vaccine Never done    Cervical cancer screen Never done    Shingles vaccine Never done    COVID-19 Vaccine 06/28/2021     Review of Systems   Constitutional:  Positive for fatigue. Negative for chills and fever. HENT:  Negative for rhinorrhea and sneezing. Respiratory:  Negative for cough and shortness of breath. Cardiovascular:  Negative for chest pain and palpitations. Gastrointestinal:  Negative for abdominal pain, blood in stool, diarrhea, nausea and vomiting. Genitourinary:  Negative for hematuria. Skin:  Negative for rash. Neurological:  Negative for dizziness, light-headedness and headaches. All other systems reviewed and are negative. Past Medical History:          Diagnosis Date    Kidney stones 2017       Past Surgical History:          Procedure Laterality Date    COLONOSCOPY         Medications:      has a current medication list which includes the following prescription(s): loperamide, ibuprofen, and vitamin d. Allergies:      Patient has no known allergies.     Social History     Socioeconomic History
Viral gastroenteritis        2. Encounter to establish care  Comprehensive Metabolic Panel    CBC with Auto Differential    Vitamin D 25 Hydroxy    Vitamin B12 & Folate    TSH    T4, Free    Hepatic Function Panel          PLAN      After discussion with resident physician, we agreed on plan as follows:    Small, frequent meals with plenty of fluids  Get plenty of rest  Hold loperamide at this time as patient no longer having diarrhea  Check FLP, CMP, CBC, free T4/TSH, vitamin D 25 OH, and vitamin B12/folate as these were previously ordered and patient never completed  Follow-up in 1 month for well visit and closure of care gaps at that time. Attending Physician Statement  I have discussed the case, including pertinent history and exam findings with the resident. I agree with the documented assessment and plan as documented by the resident.   GE modifier added  to this encounter     Electronically signed by Ernesto Olivia MD on 7/16/2023 at 9:11 AM

## 2023-07-17 ENCOUNTER — NURSE ONLY (OUTPATIENT)
Dept: LAB | Age: 53
End: 2023-07-17

## 2023-07-17 DIAGNOSIS — Z76.89 ENCOUNTER TO ESTABLISH CARE: ICD-10-CM

## 2023-07-17 LAB
25(OH)D3 SERPL-MCNC: 16 NG/ML (ref 30–100)
ALBUMIN SERPL BCG-MCNC: 4.4 G/DL (ref 3.5–5.1)
ALP SERPL-CCNC: 85 U/L (ref 38–126)
ALT SERPL W/O P-5'-P-CCNC: 27 U/L (ref 11–66)
ANION GAP SERPL CALC-SCNC: 11 MEQ/L (ref 8–16)
AST SERPL-CCNC: 22 U/L (ref 5–40)
BASOPHILS ABSOLUTE: 0 THOU/MM3 (ref 0–0.1)
BASOPHILS NFR BLD AUTO: 0.8 %
BILIRUB CONJ SERPL-MCNC: < 0.2 MG/DL (ref 0–0.3)
BILIRUB SERPL-MCNC: 0.8 MG/DL (ref 0.3–1.2)
BUN SERPL-MCNC: 14 MG/DL (ref 7–22)
CALCIUM SERPL-MCNC: 9.8 MG/DL (ref 8.5–10.5)
CHLORIDE SERPL-SCNC: 108 MEQ/L (ref 98–111)
CO2 SERPL-SCNC: 24 MEQ/L (ref 23–33)
CREAT SERPL-MCNC: 0.5 MG/DL (ref 0.4–1.2)
DEPRECATED RDW RBC AUTO: 43.2 FL (ref 35–45)
EOSINOPHIL NFR BLD AUTO: 3 %
EOSINOPHILS ABSOLUTE: 0.2 THOU/MM3 (ref 0–0.4)
ERYTHROCYTE [DISTWIDTH] IN BLOOD BY AUTOMATED COUNT: 12.1 % (ref 11.5–14.5)
FOLATE SERPL-MCNC: 13 NG/ML (ref 4.8–24.2)
GFR SERPL CREATININE-BSD FRML MDRD: > 60 ML/MIN/1.73M2
GLUCOSE SERPL-MCNC: 81 MG/DL (ref 70–108)
HCT VFR BLD AUTO: 44.7 % (ref 37–47)
HGB BLD-MCNC: 14.3 GM/DL (ref 12–16)
IMM GRANULOCYTES # BLD AUTO: 0.02 THOU/MM3 (ref 0–0.07)
IMM GRANULOCYTES NFR BLD AUTO: 0.3 %
LYMPHOCYTES ABSOLUTE: 2.3 THOU/MM3 (ref 1–4.8)
LYMPHOCYTES NFR BLD AUTO: 38.2 %
MCH RBC QN AUTO: 31 PG (ref 26–33)
MCHC RBC AUTO-ENTMCNC: 32 GM/DL (ref 32.2–35.5)
MCV RBC AUTO: 97 FL (ref 81–99)
MONOCYTES ABSOLUTE: 0.5 THOU/MM3 (ref 0.4–1.3)
MONOCYTES NFR BLD AUTO: 8.3 %
NEUTROPHILS NFR BLD AUTO: 49.4 %
NRBC BLD AUTO-RTO: 0 /100 WBC
PLATELET # BLD AUTO: 225 THOU/MM3 (ref 130–400)
PMV BLD AUTO: 9.4 FL (ref 9.4–12.4)
POTASSIUM SERPL-SCNC: 4.1 MEQ/L (ref 3.5–5.2)
PROT SERPL-MCNC: 7.4 G/DL (ref 6.1–8)
RBC # BLD AUTO: 4.61 MILL/MM3 (ref 4.2–5.4)
SEGMENTED NEUTROPHILS ABSOLUTE COUNT: 3 THOU/MM3 (ref 1.8–7.7)
SODIUM SERPL-SCNC: 143 MEQ/L (ref 135–145)
T4 FREE SERPL-MCNC: 1.62 NG/DL (ref 0.93–1.76)
TSH SERPL DL<=0.005 MIU/L-ACNC: 0.71 UIU/ML (ref 0.4–4.2)
VIT B12 SERPL-MCNC: 512 PG/ML (ref 211–911)
WBC # BLD AUTO: 6 THOU/MM3 (ref 4.8–10.8)

## 2023-07-28 ENCOUNTER — TELEPHONE (OUTPATIENT)
Dept: FAMILY MEDICINE CLINIC | Age: 53
End: 2023-07-28

## 2023-07-28 DIAGNOSIS — E55.9 VITAMIN D DEFICIENCY: Primary | ICD-10-CM

## 2023-07-28 NOTE — TELEPHONE ENCOUNTER
Please call patient and let her know her Vitamin D is low. I have refilled her Vitamin D and sent it to the pharmacy. Thanks!   TM

## 2024-01-03 ENCOUNTER — OFFICE VISIT (OUTPATIENT)
Dept: FAMILY MEDICINE CLINIC | Age: 54
End: 2024-01-03

## 2024-01-03 VITALS
SYSTOLIC BLOOD PRESSURE: 120 MMHG | TEMPERATURE: 98.4 F | WEIGHT: 111.6 LBS | OXYGEN SATURATION: 99 % | HEART RATE: 65 BPM | DIASTOLIC BLOOD PRESSURE: 74 MMHG | HEIGHT: 61 IN | RESPIRATION RATE: 12 BRPM | BODY MASS INDEX: 21.07 KG/M2

## 2024-01-03 DIAGNOSIS — G47.00 INSOMNIA, UNSPECIFIED TYPE: ICD-10-CM

## 2024-01-03 DIAGNOSIS — H53.8 BLURRY VISION: ICD-10-CM

## 2024-01-03 DIAGNOSIS — R79.89 LOW VITAMIN D LEVEL: ICD-10-CM

## 2024-01-03 DIAGNOSIS — R42 DIZZINESS: Primary | ICD-10-CM

## 2024-01-03 DIAGNOSIS — E78.00 HYPERCHOLESTEREMIA: ICD-10-CM

## 2024-01-03 ASSESSMENT — PATIENT HEALTH QUESTIONNAIRE - PHQ9
SUM OF ALL RESPONSES TO PHQ QUESTIONS 1-9: 0
SUM OF ALL RESPONSES TO PHQ QUESTIONS 1-9: 0
1. LITTLE INTEREST OR PLEASURE IN DOING THINGS: 0
2. FEELING DOWN, DEPRESSED OR HOPELESS: 0
SUM OF ALL RESPONSES TO PHQ QUESTIONS 1-9: 0
SUM OF ALL RESPONSES TO PHQ QUESTIONS 1-9: 0
SUM OF ALL RESPONSES TO PHQ9 QUESTIONS 1 & 2: 0

## 2024-01-03 ASSESSMENT — ENCOUNTER SYMPTOMS
COUGH: 1
SORE THROAT: 0
SINUS PAIN: 0
RHINORRHEA: 0
SINUS PRESSURE: 0
SHORTNESS OF BREATH: 0
ABDOMINAL PAIN: 0
SORE THROAT: 1

## 2024-01-03 NOTE — PROGRESS NOTES
[facility] PROFESSIONAL SERVS  [facility] PRACTICE  [address]  [facility] [address]  Dept: [phone]  Dept Fax: [phone]  Loc: [phone]  Resident Note    Assessment & Plan:    1. Dizziness    2. Hypercholesteremia    3. Low vitamin D level    4. Insomnia, unspecified type    5. Blurry vision       Orders Placed This Encounter    Hemoglobin A1C     Standing Status:   Future     Standing Expiration Date:   1/2/2025    Lipid, Fasting     Standing Status:   Future     Standing Expiration Date:   1/3/2025    CBC with Auto Differential     Standing Status:   Future     Standing Expiration Date:   1/2/2025    Comprehensive Metabolic Panel     Standing Status:   Future     Standing Expiration Date:   1/3/2025    EKG 12 Lead - Clinic Performed     Order Specific Question:   Reason for Exam?     Answer:   Dizziness      Dizziness  - Transient  - EKG in office was normal  - No focal neurologic deficits  - Unclear etiology, mild vasovagal episode versus dehydration versus hypoglycemia?  - Advised patient to continue monitoring for now and if symptoms recur to either seek medical care in ED or return to clinic  - Plan for further workup if symptoms recur, such as CTA head and neck, MRI brain, or ischemic heart evaluation  - For now, we will proceed with basic labs    Insomnia, chronic  - Advised focusing on sleep hygiene  - Plan to discuss further on return visit in a month    BPPV  - Unclear if related to the transient episode yesterday, but has coexisting BPPV that is not significantly bothersome currently  - Consider physical therapy if significantly symptomatic    Return in 1 month for insomnia, reevaluation of dizziness.  ----------------------------------------------------------------------------------------------------------    JAMES Garrison Remedios is a 53 y.o. female who presents today for:    Chief Complaint   Patient presents with    Dizziness     Pt presents c/o

## 2024-01-03 NOTE — PROGRESS NOTES
Patient:Bryan Whittaker Sex: female  YOB: 1970 Age:53 y.o.  MRN: [id number]    Subjective   Chief Complaint: Dizziness (Pt presents c/o feeling \"off-balance\" and some blurry vision which started yesterday mostly (pt also reports recently getting over URI Sxs). Pt denied SOB, CP, fatigue, diaphoresis, headaches, N&V. She notices these episodes when she lays down. )    Dizziness  Associated symptoms include coughing and a sore throat. Pertinent negatives include no chest pain, chills, congestion, fever, headaches or weakness.   :   Patient is a 52 yo female with no pertinent PMH who presents to the clinic due to 1 episode of dizzy spell. Patient states she was sitting down when she sudden felt a sensation she describes felling faint.  She said the feeling was accompanied by blurred vision that improved within minutes.She does not report syncope. Patient could not pin point a specific trigger. She has never had similar episode before. She denies palpitation, chest pain, headaches, weakness     She also reported having a cough and sore throat that started 9 days ago. She took Robitussin, dayquil, nyquil, mucinex which good relieve. She still report mild cough productive of clear sputum. She denied fever, chills, nasal congestion or facial pressure.    Review of Systems   Review of Systems   Constitutional:  Negative for chills and fever.   HENT:  Positive for sore throat. Negative for congestion, rhinorrhea, sinus pressure and sinus pain.    Respiratory:  Positive for cough. Negative for shortness of breath.    Cardiovascular:  Negative for chest pain and palpitations.   Neurological:  Positive for dizziness. Negative for syncope, weakness and headaches.       Vitals & Physical Examination     Vitals:    01/03/24 1622   BP: 120/74   Pulse: 65   Resp: 12   Temp: 98.4 °F (36.9 °C)   SpO2: 99%   Weight: 50.6 kg (111 lb 9.6 oz)   Height: 1.549 m (5' 1\")    Body mass index is 21.09 kg/m².    Physical

## 2024-01-04 NOTE — PROGRESS NOTES
Attending Physician Note    I saw and evaluated the patient, performing the key elements of the service.  I discussed the findings, assessment and plan with the resident and agree with the resident's findings and plan as documented in the resident's note.  GC modifier added.    Brief summary:  Brief episode of dizziness while seated - follow for now.  Check CBC, comp profile. Needs further evaluation if continues with these episodes.  Cerumen impaction - use OTC wax softening drops.

## 2024-01-08 ENCOUNTER — NURSE ONLY (OUTPATIENT)
Dept: LAB | Age: 54
End: 2024-01-08

## 2024-01-08 DIAGNOSIS — R42 DIZZINESS: ICD-10-CM

## 2024-01-08 DIAGNOSIS — E78.00 HYPERCHOLESTEREMIA: ICD-10-CM

## 2024-01-08 DIAGNOSIS — H53.8 BLURRY VISION: ICD-10-CM

## 2024-01-08 LAB
ALBUMIN SERPL BCG-MCNC: 4.6 G/DL (ref 3.5–5.1)
ALP SERPL-CCNC: 103 U/L (ref 38–126)
ALT SERPL W/O P-5'-P-CCNC: 17 U/L (ref 11–66)
ANION GAP SERPL CALC-SCNC: 9 MEQ/L (ref 8–16)
AST SERPL-CCNC: 20 U/L (ref 5–40)
BASOPHILS ABSOLUTE: 0.1 THOU/MM3 (ref 0–0.1)
BASOPHILS NFR BLD AUTO: 1 %
BILIRUB SERPL-MCNC: 1.2 MG/DL (ref 0.3–1.2)
BUN SERPL-MCNC: 12 MG/DL (ref 7–22)
CALCIUM SERPL-MCNC: 10 MG/DL (ref 8.5–10.5)
CHLORIDE SERPL-SCNC: 105 MEQ/L (ref 98–111)
CHOLESTEROL, FASTING: 230 MG/DL (ref 100–199)
CO2 SERPL-SCNC: 27 MEQ/L (ref 23–33)
CREAT SERPL-MCNC: 0.4 MG/DL (ref 0.4–1.2)
DEPRECATED MEAN GLUCOSE BLD GHB EST-ACNC: 93 MG/DL (ref 70–126)
DEPRECATED RDW RBC AUTO: 43.9 FL (ref 35–45)
EOSINOPHIL NFR BLD AUTO: 4.6 %
EOSINOPHILS ABSOLUTE: 0.2 THOU/MM3 (ref 0–0.4)
ERYTHROCYTE [DISTWIDTH] IN BLOOD BY AUTOMATED COUNT: 12.4 % (ref 11.5–14.5)
GFR SERPL CREATININE-BSD FRML MDRD: > 60 ML/MIN/1.73M2
GLUCOSE SERPL-MCNC: 83 MG/DL (ref 70–108)
HBA1C MFR BLD HPLC: 5.1 % (ref 4.4–6.4)
HCT VFR BLD AUTO: 43.5 % (ref 37–47)
HDLC SERPL-MCNC: 70 MG/DL
HGB BLD-MCNC: 14 GM/DL (ref 12–16)
IMM GRANULOCYTES # BLD AUTO: 0.01 THOU/MM3 (ref 0–0.07)
IMM GRANULOCYTES NFR BLD AUTO: 0.2 %
LDLC SERPL CALC-MCNC: 144 MG/DL
LYMPHOCYTES ABSOLUTE: 2 THOU/MM3 (ref 1–4.8)
LYMPHOCYTES NFR BLD AUTO: 38.5 %
MCH RBC QN AUTO: 31 PG (ref 26–33)
MCHC RBC AUTO-ENTMCNC: 32.2 GM/DL (ref 32.2–35.5)
MCV RBC AUTO: 96.2 FL (ref 81–99)
MONOCYTES ABSOLUTE: 0.4 THOU/MM3 (ref 0.4–1.3)
MONOCYTES NFR BLD AUTO: 8 %
NEUTROPHILS NFR BLD AUTO: 47.7 %
NRBC BLD AUTO-RTO: 0 /100 WBC
PLATELET # BLD AUTO: 218 THOU/MM3 (ref 130–400)
PMV BLD AUTO: 9.5 FL (ref 9.4–12.4)
POTASSIUM SERPL-SCNC: 4.1 MEQ/L (ref 3.5–5.2)
PROT SERPL-MCNC: 7.5 G/DL (ref 6.1–8)
RBC # BLD AUTO: 4.52 MILL/MM3 (ref 4.2–5.4)
SEGMENTED NEUTROPHILS ABSOLUTE COUNT: 2.5 THOU/MM3 (ref 1.8–7.7)
SODIUM SERPL-SCNC: 141 MEQ/L (ref 135–145)
TRIGLYCERIDE, FASTING: 82 MG/DL (ref 0–199)
WBC # BLD AUTO: 5.2 THOU/MM3 (ref 4.8–10.8)

## 2024-03-06 ENCOUNTER — OFFICE VISIT (OUTPATIENT)
Dept: FAMILY MEDICINE CLINIC | Age: 54
End: 2024-03-06
Payer: COMMERCIAL

## 2024-03-06 VITALS
DIASTOLIC BLOOD PRESSURE: 78 MMHG | HEART RATE: 67 BPM | WEIGHT: 113 LBS | TEMPERATURE: 98.3 F | OXYGEN SATURATION: 100 % | RESPIRATION RATE: 20 BRPM | BODY MASS INDEX: 21.34 KG/M2 | HEIGHT: 61 IN | SYSTOLIC BLOOD PRESSURE: 132 MMHG

## 2024-03-06 DIAGNOSIS — E78.2 MIXED HYPERLIPIDEMIA: ICD-10-CM

## 2024-03-06 DIAGNOSIS — H61.21 RIGHT EAR IMPACTED CERUMEN: ICD-10-CM

## 2024-03-06 DIAGNOSIS — Z00.00 ROUTINE HEALTH MAINTENANCE: Primary | ICD-10-CM

## 2024-03-06 PROBLEM — R25.2 MUSCLE CRAMP: Status: RESOLVED | Noted: 2019-09-09 | Resolved: 2024-03-06

## 2024-03-06 PROCEDURE — G8484 FLU IMMUNIZE NO ADMIN: HCPCS | Performed by: STUDENT IN AN ORGANIZED HEALTH CARE EDUCATION/TRAINING PROGRAM

## 2024-03-06 PROCEDURE — 99396 PREV VISIT EST AGE 40-64: CPT | Performed by: STUDENT IN AN ORGANIZED HEALTH CARE EDUCATION/TRAINING PROGRAM

## 2024-03-06 ASSESSMENT — ENCOUNTER SYMPTOMS
SORE THROAT: 0
SHORTNESS OF BREATH: 0
ABDOMINAL PAIN: 0

## 2024-03-06 NOTE — PATIENT INSTRUCTIONS
Shingles Vaccine  - Shingrex - 2 shots    Tetanus booster - get TDAP    Flu shot - wait until next fall.    Avoid dietary fats for your cholesterol

## 2024-03-06 NOTE — PROGRESS NOTES
[facility] PROFESSIONAL [facility] FAMILY MEDICINE PRACTICE  [address]  [facility] [address]  Dept: [phone]  Dept Fax: [phone]  Loc: [phone]  Resident Note    Assessment & Plan:    1. Routine health maintenance    2. Mixed hyperlipidemia    3. Right ear impacted cerumen      Mildly high cholesterol.  ASCVD risk is 1.2%.  No family history of heart attack or strokes.  Continue dietary modification and exercise for now.    Right ear with impacted cerumen.  Continue Debrox and gentle ear flushing at home.  Return if no improvement.     Advised shingles, Tdap vaccination.    Patient declined Pap smear or referral to OB/GYN.    Obtain flu and COVID vaccine in future.    Mammogram last obtained in 2022.  Order at next year's follow up.  No family history of breast cancer.    Colonoscopy last obtained in 2021, normal, 10-year follow-up with Dr. Sebastian.    Return in 1 year for routine visit.    ----------------------------------------------------------------------------------------------------------    JAMES Adams Bladimir Whittakre is a 53 y.o. female who presents today for:    Chief Complaint   Patient presents with    Follow-up     1 mo f/u   Dizziness, blurry vision. Not sleeping well.     Obtain labs since last visit.  Cholesterol was slightly high.  Otherwise no concerns.    Dizziness, no further episodes since last visit.  - EKG in office was normal  - No focal neurologic deficits  -Patient reports to very low water intake throughout the day and typically experiences transient dizziness towards the evenings.  Likely some component of poor hydration.    Insomnia, chronic  - Advised focusing on sleep hygiene    Review of Systems   Constitutional:  Negative for chills and fever.   HENT:  Negative for sore throat.    Respiratory:  Negative for shortness of breath.    Cardiovascular:  Negative for chest pain, palpitations and leg swelling.   Gastrointestinal:  Negative for

## 2024-03-07 NOTE — PROGRESS NOTES
Attending Physician Note    I reviewed the patient's medical history, the resident's findings on physical examination, the patient's diagnoses, and treatment plan as documented in the resident note.  I concur with the treatment plan as documented.  Any additional suggestions noted below.    GE modifier    Brief summary:   Wellness - reviewed preventive recommendations.    Mixed hld - ASCVD risk low.  Lifestyle modifications discussed.  Follow.  Cerumen impaction - irrigated with removal.

## 2024-04-22 ENCOUNTER — OFFICE VISIT (OUTPATIENT)
Dept: FAMILY MEDICINE CLINIC | Age: 54
End: 2024-04-22
Payer: COMMERCIAL

## 2024-04-22 VITALS
OXYGEN SATURATION: 98 % | DIASTOLIC BLOOD PRESSURE: 74 MMHG | HEART RATE: 68 BPM | TEMPERATURE: 98.4 F | BODY MASS INDEX: 20.2 KG/M2 | HEIGHT: 61 IN | WEIGHT: 107 LBS | SYSTOLIC BLOOD PRESSURE: 126 MMHG | RESPIRATION RATE: 18 BRPM

## 2024-04-22 DIAGNOSIS — Z13.9 SCREENING DUE: ICD-10-CM

## 2024-04-22 DIAGNOSIS — E78.2 MIXED HYPERLIPIDEMIA: Primary | ICD-10-CM

## 2024-04-22 DIAGNOSIS — Z82.3 FAMILY HISTORY OF CEREBROVASCULAR ACCIDENT (CVA) DUE TO ANEURYSM: ICD-10-CM

## 2024-04-22 PROCEDURE — G8420 CALC BMI NORM PARAMETERS: HCPCS | Performed by: STUDENT IN AN ORGANIZED HEALTH CARE EDUCATION/TRAINING PROGRAM

## 2024-04-22 PROCEDURE — G8427 DOCREV CUR MEDS BY ELIG CLIN: HCPCS | Performed by: STUDENT IN AN ORGANIZED HEALTH CARE EDUCATION/TRAINING PROGRAM

## 2024-04-22 PROCEDURE — 1036F TOBACCO NON-USER: CPT | Performed by: STUDENT IN AN ORGANIZED HEALTH CARE EDUCATION/TRAINING PROGRAM

## 2024-04-22 PROCEDURE — 99214 OFFICE O/P EST MOD 30 MIN: CPT | Performed by: STUDENT IN AN ORGANIZED HEALTH CARE EDUCATION/TRAINING PROGRAM

## 2024-04-22 PROCEDURE — 3017F COLORECTAL CA SCREEN DOC REV: CPT | Performed by: STUDENT IN AN ORGANIZED HEALTH CARE EDUCATION/TRAINING PROGRAM

## 2024-04-22 RX ORDER — ATORVASTATIN CALCIUM 10 MG/1
10 TABLET, FILM COATED ORAL DAILY
Qty: 90 TABLET | Refills: 1 | Status: SHIPPED | OUTPATIENT
Start: 2024-04-22

## 2024-04-22 NOTE — PROGRESS NOTES
S: 53 y.o. female with   Chief Complaint   Patient presents with    discuss getting mri done       HPI: please see resident note for HPI and ROS.    BP Readings from Last 3 Encounters:   04/22/24 126/74   03/06/24 132/78   01/03/24 120/74     Wt Readings from Last 3 Encounters:   04/22/24 48.5 kg (107 lb)   03/06/24 51.3 kg (113 lb)   01/03/24 50.6 kg (111 lb 9.6 oz)       O: VS:  height is 1.549 m (5' 1\") and weight is 48.5 kg (107 lb). Her oral temperature is 98.4 °F (36.9 °C). Her blood pressure is 126/74 and her pulse is 68. Her respiration is 18 and oxygen saturation is 98%.   AAO/NAD, appropriate affect for mood  CV:  RRR, no murmur  Resp: CTAB       Diagnosis Orders   1. Mixed hyperlipidemia  atorvastatin (LIPITOR) 10 MG tablet    Lipid, Fasting      2. Screening due  MRA HEAD WO CONTRAST      3. Family history of cerebrovascular accident (CVA) due to aneurysm  MRA HEAD WO CONTRAST          Plan:  Please refer to resident note for full plan.    53 old female presents the office for concerns of cerebral aneurysms.  Patient states that she has a strong family history of both of her brothers having subarachnoid hemorrhage due to berry aneurysm rupture.  Is interested in screening.  Will attempt to get MRI head, but no concern for active symptoms.  Does have a history of hyperlipidemia.  Will start Lipitor recheck blood work in the next 2 to 3 months.    Health Maintenance Due   Topic Date Due    Hepatitis B vaccine (1 of 3 - 3-dose series) Never done    DTaP/Tdap/Td vaccine (1 - Tdap) Never done    Cervical cancer screen  Never done    Shingles vaccine (1 of 2) Never done    COVID-19 Vaccine (2 - 2023-24 season) 09/01/2023       Attending Physician Statement  I have discussed the case, including pertinent history and exam findings with the resident. I also have seen the patient and performed key portions of the examination.  I agree with the documented assessment and plan as documented by the resident. TRENT

## 2024-04-22 NOTE — PROGRESS NOTES
[facility]  [address]  [facility] [address]  Dept: [phone]  Dept Fax: [phone]  Loc: [phone]  Resident Note    Assessment & Plan:    1. Mixed hyperlipidemia    2. Screening due    3. Family history of cerebrovascular accident (CVA) due to aneurysm       Orders Placed This Encounter    MRA HEAD WO CONTRAST     Standing Status:   Future     Standing Expiration Date:   4/22/2025     Order Specific Question:   Reason for exam:     Answer:   2 first degree relatives w/ intracranial aneurysms, would like screening for intracranial aneurysms     Order Specific Question:   What is the sedation requirement?     Answer:   None    atorvastatin (LIPITOR) 10 MG tablet     Sig: Take 1 tablet by mouth daily     Dispense:  90 tablet     Refill:  1     Hyperlipidemia  - Shared decision making with patient, agreeable to start low-dose statin  - Repeat fasting lipids in 2 to 3 months.    Strong family history of CVA due to ruptured brain aneurysm -2 brothers  Was a strongly advised to obtain screening for aneurysm  - Will order MRA.    Return if symptoms worsen or fail to improve.    Future Appointments   Date Time Provider Department Center   3/11/2025  1:40 PM Mart Huerta,  [facility]  [facility]       -----------------------------------------------------------------------------------------------------------    JAMES Adams Nhmeliton Remedios is a 53 y.o. female who presents today for:    Chief Complaint   Patient presents with    discuss getting mri done     Brother passed away last month at 63 from SAH due to aneurysms. She has another brother with known SAH from aneurysm. She now has two first-degree relatives with intracranial aneurysm. She is here to discuss getting an MRI.      Review of Systems   Constitutional:  Negative for chills and fever.   Respiratory:  Negative for cough and shortness of breath.    Cardiovascular:

## 2024-04-23 ASSESSMENT — ENCOUNTER SYMPTOMS
COUGH: 0
ABDOMINAL PAIN: 0
SHORTNESS OF BREATH: 0

## 2024-05-21 ENCOUNTER — HOSPITAL ENCOUNTER (OUTPATIENT)
Dept: MRI IMAGING | Age: 54
Discharge: HOME | End: 2024-05-21
Payer: COMMERCIAL

## 2024-05-21 DIAGNOSIS — Z82.3 FAMILY HISTORY OF CEREBROVASCULAR ACCIDENT (CVA) DUE TO ANEURYSM: ICD-10-CM

## 2024-05-21 DIAGNOSIS — Z13.9 SCREENING DUE: ICD-10-CM

## 2024-05-21 PROCEDURE — 70544 MR ANGIOGRAPHY HEAD W/O DYE: CPT

## 2024-05-29 ENCOUNTER — TELEPHONE (OUTPATIENT)
Dept: FAMILY MEDICINE CLINIC | Age: 54
End: 2024-05-29

## 2024-05-29 NOTE — TELEPHONE ENCOUNTER
----- Message from Yina Hinojosa MD sent at 5/28/2024  9:53 PM EDT -----  Please let patient know that the brain imaging did not show presence of aneurysms. We should repeat this imaging in 5 years to check again.

## 2024-07-25 ENCOUNTER — OFFICE VISIT (OUTPATIENT)
Dept: FAMILY MEDICINE CLINIC | Age: 54
End: 2024-07-25

## 2024-07-25 VITALS
SYSTOLIC BLOOD PRESSURE: 130 MMHG | DIASTOLIC BLOOD PRESSURE: 86 MMHG | WEIGHT: 106.8 LBS | HEIGHT: 61 IN | BODY MASS INDEX: 20.16 KG/M2 | RESPIRATION RATE: 12 BRPM | TEMPERATURE: 97.9 F | HEART RATE: 62 BPM | OXYGEN SATURATION: 99 %

## 2024-07-25 DIAGNOSIS — R42 DIZZINESS: Primary | ICD-10-CM

## 2024-07-25 DIAGNOSIS — G47.9 DIFFICULTY SLEEPING: ICD-10-CM

## 2024-07-25 DIAGNOSIS — Z87.898 HISTORY OF VERTIGO: ICD-10-CM

## 2024-07-25 DIAGNOSIS — H83.2X2 VESTIBULAR DISEQUILIBRIUM INVOLVING LEFT INNER EAR: ICD-10-CM

## 2024-07-25 RX ORDER — MECLIZINE HYDROCHLORIDE 25 MG/1
25 TABLET ORAL 3 TIMES DAILY PRN
Qty: 90 TABLET | Refills: 0 | Status: SHIPPED | OUTPATIENT
Start: 2024-07-25 | End: 2024-08-24

## 2024-07-25 RX ORDER — TRAZODONE HYDROCHLORIDE 50 MG/1
50 TABLET ORAL NIGHTLY
Qty: 30 TABLET | Refills: 1 | Status: SHIPPED | OUTPATIENT
Start: 2024-07-25

## 2024-07-25 NOTE — PROGRESS NOTES
[facility] PROFESSIONAL [facility] MEDICINE PRACTICE  [address]  [facility] [address]  Dept: [phone]  Dept Fax: [phone]  Loc: [phone]    HPI:     Bryan Whittaker is a 54 y.o. female who presents today for:  Chief Complaint   Patient presents with    Dizziness     While laying down and also trouble with sleeping       Patient states having dizziness when laying down and sleeping, and turns to her left side. States she has vertigo. Appears to be chronic.    Reports trouble sleeping.    Health Maintenance Topics with due status: Overdue       Topic Date Due    Hepatitis B vaccine Never done    DTaP/Tdap/Td vaccine Never done    Cervical cancer screen Never done    Shingles vaccine Never done    COVID-19 Vaccine 09/01/2023     Health Maintenance Topics with due status: Due On       Topic Date Due    Breast cancer screen 07/05/2024     Review of Systems   Constitutional:  Negative for chills and fever.   HENT:  Negative for rhinorrhea and sneezing.    Respiratory:  Negative for cough and shortness of breath.    Cardiovascular:  Negative for chest pain and palpitations.   Gastrointestinal:  Negative for abdominal pain, blood in stool, diarrhea, nausea and vomiting.   Genitourinary:  Negative for hematuria.   Skin:  Negative for rash.   Neurological:  Positive for dizziness. Negative for light-headedness and headaches.   Psychiatric/Behavioral:  Positive for sleep disturbance.    All other systems reviewed and are negative.      Past Medical History:          Diagnosis Date    Kidney stones 2017       Past Surgical History:          Procedure Laterality Date    COLONOSCOPY         Medications:      has a current medication list which includes the following prescription(s): meclizine, trazodone, vitamin d, and atorvastatin.     Allergies:      Patient has no known allergies.    Social History     Socioeconomic History    Marital status:      Spouse name: None

## 2024-07-25 NOTE — PATIENT INSTRUCTIONS
Thank you   Thank you for trusting us with your healthcare needs. You may receive a survey regarding today's visit. It would help us out if you would take a few moments to provide your feedback. We value your input.  Please bring in ALL medications BOTTLES, including inhalers, herbal supplements, over the counter, prescribed & non-prescribed medicine. The office would like actual medication bottles and a list.         4.  Prior to getting your labs drawn, check with your insurance company for benefits and eligibility of lab services.  Often, insurance companies cover certain tests for preventative visits only.  It is patient's responsibility to    see what is covered.    5.  If the list below has been completed, PLEASE FAX RECORDS TO OUR OFFICE @ [phone]. Once the records have been received we will update your records at our office:  Health Maintenance Due   Topic Date Due    Hepatitis B vaccine (1 of 3 - 3-dose series) Never done    DTaP/Tdap/Td vaccine (1 - Tdap) Never done    Cervical cancer screen  Never done    Shingles vaccine (1 of 2) Never done    COVID-19 Vaccine (2 - 2023-24 season) 09/01/2023    Breast cancer screen  07/05/2024

## 2024-07-25 NOTE — PROGRESS NOTES
Health Maintenance Due   Topic Date Due    Hepatitis B vaccine (1 of 3 - 3-dose series) Never done    DTaP/Tdap/Td vaccine (1 - Tdap) Never done    Cervical cancer screen  Never done    Shingles vaccine (1 of 2) Never done    COVID-19 Vaccine (2 - 2023-24 season) 09/01/2023    Breast cancer screen  07/05/2024

## 2024-11-18 ENCOUNTER — OFFICE VISIT (OUTPATIENT)
Dept: ENT CLINIC | Age: 54
End: 2024-11-18
Payer: COMMERCIAL

## 2024-11-18 VITALS
HEIGHT: 61 IN | HEART RATE: 70 BPM | BODY MASS INDEX: 20.15 KG/M2 | SYSTOLIC BLOOD PRESSURE: 118 MMHG | OXYGEN SATURATION: 100 % | DIASTOLIC BLOOD PRESSURE: 70 MMHG | RESPIRATION RATE: 18 BRPM | TEMPERATURE: 97.7 F | WEIGHT: 106.7 LBS

## 2024-11-18 DIAGNOSIS — H61.23 BILATERAL IMPACTED CERUMEN: Primary | ICD-10-CM

## 2024-11-18 DIAGNOSIS — R42 DIZZINESS: ICD-10-CM

## 2024-11-18 DIAGNOSIS — H93.19 TINNITUS, UNSPECIFIED LATERALITY: ICD-10-CM

## 2024-11-18 PROCEDURE — 1036F TOBACCO NON-USER: CPT | Performed by: REGISTERED NURSE

## 2024-11-18 PROCEDURE — 3017F COLORECTAL CA SCREEN DOC REV: CPT | Performed by: REGISTERED NURSE

## 2024-11-18 PROCEDURE — 69210 REMOVE IMPACTED EAR WAX UNI: CPT | Performed by: REGISTERED NURSE

## 2024-11-18 PROCEDURE — 99203 OFFICE O/P NEW LOW 30 MIN: CPT | Performed by: REGISTERED NURSE

## 2024-11-18 PROCEDURE — G8484 FLU IMMUNIZE NO ADMIN: HCPCS | Performed by: REGISTERED NURSE

## 2024-11-18 PROCEDURE — G8420 CALC BMI NORM PARAMETERS: HCPCS | Performed by: REGISTERED NURSE

## 2024-11-18 PROCEDURE — G8427 DOCREV CUR MEDS BY ELIG CLIN: HCPCS | Performed by: REGISTERED NURSE

## 2024-11-18 RX ORDER — CALCIUM CARBONATE 500(1250)
500 TABLET ORAL DAILY
COMMUNITY

## 2024-11-18 RX ORDER — MAGNESIUM 30 MG
30 TABLET ORAL 2 TIMES DAILY
COMMUNITY

## 2024-11-18 NOTE — PROGRESS NOTES
[facility] [place] [facility]  [facility] EAR, NOSE AND THROAT  [address]  [address]  [facility] [address]  Dept: [phone]  Dept Fax: [phone]  Loc: [phone]    Bryan Whittaker is a 54 y.o. female who was referred by Tremaine Garbiel DO for:  Chief Complaint   Patient presents with    New Patient      Patient here for dizziness,vestibular disequilibrium involving left inner ear. Patient stated that her left ear is feeling ok today.   .    HPI:     Bryan Whittaker presents today for ear concerns.  Patient was referred by Tremaine Gabriel DO; notes reviewed.    Patient reports she hasn't been sleeping well due to having intermittent 'humming' sensation to her ears; believes more so to the left ear than the right. No complaints of otalgia, otorrhea, history of recurrent ear infections or prior tube placement. She notes last dizziness occurrence being several months ago. Patient denies sensation of the room spinning and her sitting still; on chart review there was concern for positive Vincent Hallpike at this time. Patient denies any concern regarding her hearing.      Subjective:      REVIEW OF SYSTEMS:    Pertinent positives as noted in the HPI. All other systems reviewed and negative.    ALLERGIES:  Patient has no known allergies.    Past Medical History:  Past Medical History:   Diagnosis Date    Kidney stones 2017       Family History:       Problem Relation Age of Onset    No Known Problems Mother     No Known Problems Father     Other Brother         brain anuerysm    No Known Problems Sister     High Blood Pressure Sister     No Known Problems Brother     No Known Problems Brother        Surgical History:  Past Surgical History:   Procedure Laterality Date    COLONOSCOPY          MEDICATIONS:  Current Outpatient Medications   Medication Sig Dispense Refill    magnesium 30 MG tablet Take 1 tablet by mouth 2 times daily      calcium carbonate (OSCAL) 500 MG TABS tablet Take 1 tablet by mouth

## 2025-01-22 ENCOUNTER — OFFICE VISIT (OUTPATIENT)
Dept: ENT CLINIC | Age: 55
End: 2025-01-22
Payer: COMMERCIAL

## 2025-01-22 VITALS
DIASTOLIC BLOOD PRESSURE: 64 MMHG | SYSTOLIC BLOOD PRESSURE: 128 MMHG | HEART RATE: 75 BPM | TEMPERATURE: 98.2 F | OXYGEN SATURATION: 100 %

## 2025-01-22 DIAGNOSIS — H61.21 IMPACTED CERUMEN OF RIGHT EAR: Primary | ICD-10-CM

## 2025-01-22 PROCEDURE — 69210 REMOVE IMPACTED EAR WAX UNI: CPT | Performed by: REGISTERED NURSE

## 2025-01-22 NOTE — PROGRESS NOTES
Procedure note:  DATE AND TIME OF PROCEDURE: 1/22/2025 2:56 PM  PATIENT NAME: Bryan Whittaker  MRN [id number]  PROVIDER: DAVID Sharp CNP   PRE-PROCEDURE DIAGNOSIS:  Right cerumen impaction  POST-PROCEDURE DIAGNOSIS:   Same     INDICATION: Cerumenosis causing an inability to visualize the tympanic membrane, middle ear space and/or external auditory canal.     TEACHING: Procedure, benefits, and risks were explained to patient/family. Verbal informed consent was obtained.    TIME OUT: A time out was conducted immediately before starting the procedure that confirmed a final verification of the correct patient, correct procedure, correct patient position, correct site and availability of special equipment.    DESCRIPTION OF PROCEDURE:  PROCEDURE: Cerumenectomy  SITE: Right ear(s)    ANESTHESIA:  NONE  COMPLICATIONS: NONE  EBL: NONE    PROCEDURE: Handheld otoscope used to visualize EAC. Right cerumen in place, obstructing visualization of tympanic membranes.  Cerumen partially removed with alligator forceps and suction until tympanic membranes could be visualized as documented above.  Patient with erythematic and dry ear canal and tenderness with instrumentation. Remaining cerumen/debris overlying anterior/inferior quadrant of TM and patient deferred further instrumentation. Patient had not utilized sweet oil as instructed leading up to appointment today. The patient tolerated the procedure well, with no complications.    Patient will utilize sweet oil 2 drops BID to right ear with return visit in the next week for removal of remaining cerumen. She denies any further tinnitus, otalgia, or dizziness since initial visit.

## 2025-01-27 ENCOUNTER — TELEPHONE (OUTPATIENT)
Dept: FAMILY MEDICINE CLINIC | Age: 55
End: 2025-01-27

## 2025-01-27 ENCOUNTER — PROCEDURE VISIT (OUTPATIENT)
Dept: ENT CLINIC | Age: 55
End: 2025-01-27
Payer: COMMERCIAL

## 2025-01-27 VITALS
TEMPERATURE: 98.3 F | OXYGEN SATURATION: 99 % | WEIGHT: 103.9 LBS | DIASTOLIC BLOOD PRESSURE: 68 MMHG | HEART RATE: 73 BPM | BODY MASS INDEX: 19.64 KG/M2 | SYSTOLIC BLOOD PRESSURE: 128 MMHG

## 2025-01-27 DIAGNOSIS — H61.21 IMPACTED CERUMEN OF RIGHT EAR: Primary | ICD-10-CM

## 2025-01-27 PROCEDURE — 69210 REMOVE IMPACTED EAR WAX UNI: CPT | Performed by: REGISTERED NURSE

## 2025-01-27 NOTE — PROGRESS NOTES
Procedure note:  DATE AND TIME OF PROCEDURE: 1/27/2025 12:55 PM  PATIENT NAME: Bryan Whittaker  MRN [id number]  PROVIDER: DAVID Sharp CNP   PRE-PROCEDURE DIAGNOSIS:  Right cerumen impaction  POST-PROCEDURE DIAGNOSIS:   Same     INDICATION: Cerumenosis causing an inability to visualize the tympanic membrane, middle ear space and/or external auditory canal.     TEACHING: Procedure, benefits, and risks were explained to patient/family. Verbal informed consent was obtained.    TIME OUT: A time out was conducted immediately before starting the procedure that confirmed a final verification of the correct patient, correct procedure, correct patient position, correct site and availability of special equipment.    DESCRIPTION OF PROCEDURE:  PROCEDURE: Cerumenectomy  SITE: Right ear(s)    ANESTHESIA:  NONE  COMPLICATIONS: NONE  EBL: NONE    PROCEDURE: Handheld otoscope used to visualize EAC. Right cerumen in place, obstructing visualization of tympanic membranes.  Cerumen removed with suction until tympanic membranes could be visualized as documented above. Erythema to superior quadrant with slight thickening of TM otherwise Normal exam. The patient tolerated the procedure well, with no complications. Patient noted immediate improvement in right sided hearing/fullness. No otalgia or persistent concerns after cerumen removal.

## 2025-01-27 NOTE — TELEPHONE ENCOUNTER
Pt stopped with full medication bottle of \"sleeping pills\" stated needs refill on this medication. Medication is not listed in  or currently taking. Please advise pt on this medication.

## 2025-01-28 NOTE — TELEPHONE ENCOUNTER
Alia Ge MD Hunsaker, Halley; [facility]  [facility] Clinical Staff16 hours ago (4:56 PM)     DP  Please have patient make an appointment with her PCP to discuss. Thanks!

## 2025-01-28 NOTE — TELEPHONE ENCOUNTER
Spoke with Bryan Whittaker and she states that she is going out of town and will discuss at her appointment on 03/11/2025.

## 2025-03-11 ENCOUNTER — OFFICE VISIT (OUTPATIENT)
Dept: FAMILY MEDICINE CLINIC | Age: 55
End: 2025-03-11

## 2025-03-11 VITALS
BODY MASS INDEX: 19.41 KG/M2 | WEIGHT: 102.8 LBS | HEIGHT: 61 IN | OXYGEN SATURATION: 97 % | DIASTOLIC BLOOD PRESSURE: 68 MMHG | SYSTOLIC BLOOD PRESSURE: 126 MMHG | RESPIRATION RATE: 18 BRPM | TEMPERATURE: 98.4 F | HEART RATE: 78 BPM

## 2025-03-11 DIAGNOSIS — Z13.31 DEPRESSION SCREENING: ICD-10-CM

## 2025-03-11 DIAGNOSIS — E78.00 HYPERCHOLESTEREMIA: ICD-10-CM

## 2025-03-11 DIAGNOSIS — Z00.00 ENCOUNTER FOR WELLNESS EXAMINATION IN ADULT: Primary | ICD-10-CM

## 2025-03-11 DIAGNOSIS — G47.00 INSOMNIA, UNSPECIFIED TYPE: ICD-10-CM

## 2025-03-11 DIAGNOSIS — Z12.31 SCREENING MAMMOGRAM FOR BREAST CANCER: ICD-10-CM

## 2025-03-11 DIAGNOSIS — J06.9 VIRAL URI WITH COUGH: ICD-10-CM

## 2025-03-11 DIAGNOSIS — E55.9 VITAMIN D DEFICIENCY: ICD-10-CM

## 2025-03-11 RX ORDER — TRAZODONE HYDROCHLORIDE 50 MG/1
50 TABLET ORAL NIGHTLY
Qty: 90 TABLET | Refills: 1 | Status: SHIPPED | OUTPATIENT
Start: 2025-03-11

## 2025-03-11 SDOH — ECONOMIC STABILITY: FOOD INSECURITY: WITHIN THE PAST 12 MONTHS, THE FOOD YOU BOUGHT JUST DIDN'T LAST AND YOU DIDN'T HAVE MONEY TO GET MORE.: NEVER TRUE

## 2025-03-11 SDOH — ECONOMIC STABILITY: FOOD INSECURITY: WITHIN THE PAST 12 MONTHS, YOU WORRIED THAT YOUR FOOD WOULD RUN OUT BEFORE YOU GOT MONEY TO BUY MORE.: NEVER TRUE

## 2025-03-11 ASSESSMENT — PATIENT HEALTH QUESTIONNAIRE - PHQ9
2. FEELING DOWN, DEPRESSED OR HOPELESS: NOT AT ALL
1. LITTLE INTEREST OR PLEASURE IN DOING THINGS: NOT AT ALL
SUM OF ALL RESPONSES TO PHQ QUESTIONS 1-9: 0

## 2025-03-11 NOTE — PROGRESS NOTES
S: 54 y.o. female with   Chief Complaint   Patient presents with    Annual Exam     Cough times 2 days    Insomnia     Doing well; healthy overall.  Insomnia -- trazadone refill   Vitamin D deficiency -- taking supplement  Viral cough    BP Readings from Last 3 Encounters:   03/11/25 126/68   01/27/25 128/68   01/22/25 128/64     Wt Readings from Last 3 Encounters:   03/11/25 46.6 kg (102 lb 12.8 oz)   01/27/25 47.1 kg (103 lb 14.4 oz)   11/18/24 48.4 kg (106 lb 11.2 oz)       O: VS:   Vitals:    03/11/25 1344   BP: 126/68   BP Site: Left Upper Arm   Patient Position: Sitting   BP Cuff Size: Medium Adult   Pulse: 78   Resp: 18   Temp: 98.4 °F (36.9 °C)   TempSrc: Temporal   SpO2: 97%   Weight: 46.6 kg (102 lb 12.8 oz)   Height: 1.549 m (5' 1\")     Body mass index is 19.42 kg/m².      Lab Results   Component Value Date    WBC 5.2 01/08/2024    HGB 14.0 01/08/2024    HCT 43.5 01/08/2024     01/08/2024    CHOL 211 (H) 03/25/2021    TRIG 51 03/25/2021    HDL 70 01/08/2024    LDLDIRECT 153 (H) 08/24/2018     01/08/2024    AST 20 01/08/2024     01/08/2024    K 4.1 01/08/2024     01/08/2024    CREATININE 0.4 01/08/2024    BUN 12 01/08/2024    CO2 27 01/08/2024    TSH 0.707 07/17/2023    INR 1.03 12/01/2019    LABA1C 5.1 01/08/2024    LABGLOM >60 01/08/2024    MG 2.2 08/11/2021    CALCIUM 10.0 01/08/2024    VITD25 16 (L) 07/17/2023       No results found.         Diagnosis Orders   1. Encounter for wellness examination in adult        2. Viral URI with cough        3. Hypercholesteremia  Comprehensive Metabolic Panel, Fasting    CBC    Lipid, Fasting      4. Insomnia, unspecified type  traZODone (DESYREL) 50 MG tablet    Comprehensive Metabolic Panel, Fasting    CBC    TSH reflex to FT4      5. Vitamin D deficiency  Vitamin D 25 Hydroxy      6. Screening mammogram for breast cancer  RICA DIGITAL SCREEN W OR WO CAD BILATERAL      7. Depression screening  DEPRESSION SCREEN ANNUAL

## 2025-03-11 NOTE — PATIENT INSTRUCTIONS
Supportive Care for your symptoms, includes the following:  - Ibuprofen 400 mg taken up to 3 times daily  - Will[place]-B[place] 1 tablet taken up to 2 times daily   - Increase the water you drink; more than 2 liters daily  - Tea with honey in morning and/or evening  - Cough drops as needed  - Avoid tobacco and alcohol  - Aim for at least 8 hours of sleep at night with naps as needed  - Antibiotics/inhalers/steroids needed: none  - Take all other prescribed daily medications as directed

## 2025-03-14 PROBLEM — G47.00 INSOMNIA: Status: ACTIVE | Noted: 2025-03-14

## 2025-03-14 PROBLEM — E78.00 HYPERCHOLESTEREMIA: Status: ACTIVE | Noted: 2025-03-14

## 2025-03-14 ASSESSMENT — ENCOUNTER SYMPTOMS
VOMITING: 0
DIARRHEA: 0
COUGH: 1
NAUSEA: 0
BLOOD IN STOOL: 0
PHOTOPHOBIA: 0
SHORTNESS OF BREATH: 0
CONSTIPATION: 0
ABDOMINAL PAIN: 0
WHEEZING: 0

## 2025-03-14 NOTE — ASSESSMENT & PLAN NOTE
Lab Results   Component Value Date    VITD25 16 (L) 07/17/2023    VITD25 20 (L) 08/11/2021    VITD25 18 (L) 09/09/2019   Chronic, not at goal (unstable), continue current plan pending work up below, medication adherence emphasized, and lifestyle modifications recommended. Medication refills and Lab work as below.

## 2025-03-14 NOTE — ASSESSMENT & PLAN NOTE
Chronic, at goal (stable), continue current plan pending work up below, medication adherence emphasized, and lifestyle modifications recommended. Medication refills and Lab work as below.

## 2025-03-14 NOTE — PROGRESS NOTES
Patient:Bryan Garrison Ly  YOB: 1970  MRN: [id number]    Subjective   54 y.o. female who presents for the following: Annual Exam (Cough times 2 days) and Insomnia    Insomnia  This is a chronic problem. The current episode started more than 1 year ago. Progression since onset: controlled on medications; needs refill. Associated symptoms include coughing. Pertinent negatives include no abdominal pain, chest pain, chills, fatigue, fever, headaches, nausea, neck pain, rash, vomiting or weakness. Nothing aggravates the symptoms. Treatments tried: trazadone. The treatment provided significant relief.   Cough  This is a new problem. The current episode started in the past 7 days. The problem has been waxing and waning. The problem occurs hourly. The cough is Non-productive. Pertinent negatives include no chest pain, chills, fever, headaches, rash, shortness of breath or wheezing.     Review of Systems   Review of Systems   Constitutional:  Negative for activity change, appetite change, chills, fatigue and fever.   HENT:  Negative for dental problem, hearing loss and sneezing.    Eyes:  Negative for photophobia and visual disturbance.   Respiratory:  Positive for cough. Negative for shortness of breath and wheezing.    Cardiovascular:  Negative for chest pain, palpitations and leg swelling.   Gastrointestinal:  Negative for abdominal pain, blood in stool, constipation, diarrhea, nausea and vomiting.   Genitourinary:  Negative for difficulty urinating, dysuria and hematuria.   Musculoskeletal:  Negative for gait problem, neck pain and neck stiffness.   Skin:  Negative for pallor, rash and wound.   Allergic/Immunologic: Negative for immunocompromised state.   Neurological:  Negative for dizziness, seizures, weakness and headaches.   Hematological:  Does not bruise/bleed easily.   Psychiatric/Behavioral:  Negative for confusion and dysphoric mood. The patient has insomnia. The patient is not nervous/anxious.

## 2025-03-14 NOTE — ASSESSMENT & PLAN NOTE
Lab Results   Component Value Date    CHOL 211 (H) 03/25/2021   Unclear control with labs suggestive of remote borderline control . Medication refills and Lab work as below.

## 2025-03-18 ENCOUNTER — LAB (OUTPATIENT)
Dept: LAB | Age: 55
End: 2025-03-18

## 2025-03-18 DIAGNOSIS — E78.00 HYPERCHOLESTEREMIA: ICD-10-CM

## 2025-03-18 DIAGNOSIS — G47.00 INSOMNIA, UNSPECIFIED TYPE: ICD-10-CM

## 2025-03-18 DIAGNOSIS — E55.9 VITAMIN D DEFICIENCY: ICD-10-CM

## 2025-03-18 LAB
25(OH)D3 SERPL-MCNC: 27 NG/ML (ref 30–100)
ALBUMIN SERPL BCG-MCNC: 4.3 G/DL (ref 3.4–4.9)
ALP SERPL-CCNC: 108 U/L (ref 35–104)
ALT SERPL W/O P-5'-P-CCNC: 22 U/L (ref 10–35)
ANION GAP SERPL CALC-SCNC: 12 MEQ/L (ref 8–16)
AST SERPL-CCNC: 24 U/L (ref 10–35)
BILIRUB SERPL-MCNC: 0.8 MG/DL (ref 0.3–1.2)
BUN SERPL-MCNC: 9 MG/DL (ref 8–23)
CALCIUM SERPL-MCNC: 10.3 MG/DL (ref 8.6–10)
CHLORIDE SERPL-SCNC: 104 MEQ/L (ref 98–111)
CHOLESTEROL, FASTING: 204 MG/DL (ref 100–199)
CO2 SERPL-SCNC: 26 MEQ/L (ref 22–29)
CREAT SERPL-MCNC: 0.6 MG/DL (ref 0.5–0.9)
DEPRECATED RDW RBC AUTO: 43.5 FL (ref 35–45)
ERYTHROCYTE [DISTWIDTH] IN BLOOD BY AUTOMATED COUNT: 12.3 % (ref 11.5–14.5)
GFR SERPL CREATININE-BSD FRML MDRD: > 90 ML/MIN/1.73M2
GLUCOSE FASTING: 76 MG/DL (ref 74–109)
HCT VFR BLD AUTO: 44.2 % (ref 37–47)
HDLC SERPL-MCNC: 57 MG/DL
HGB BLD-MCNC: 14 GM/DL (ref 12–16)
LDLC SERPL CALC-MCNC: 124 MG/DL
MCH RBC QN AUTO: 30.5 PG (ref 26–33)
MCHC RBC AUTO-ENTMCNC: 31.7 GM/DL (ref 32.2–35.5)
MCV RBC AUTO: 96.3 FL (ref 81–99)
PLATELET # BLD AUTO: 208 THOU/MM3 (ref 130–400)
PMV BLD AUTO: 9.5 FL (ref 9.4–12.4)
POTASSIUM SERPL-SCNC: 3.9 MEQ/L (ref 3.5–5.2)
PROT SERPL-MCNC: 7.3 G/DL (ref 6.4–8.3)
RBC # BLD AUTO: 4.59 MILL/MM3 (ref 4.2–5.4)
SODIUM SERPL-SCNC: 142 MEQ/L (ref 135–145)
TRIGLYCERIDE, FASTING: 113 MG/DL (ref 0–199)
TSH SERPL DL<=0.05 MIU/L-ACNC: 0.63 UIU/ML (ref 0.27–4.2)
WBC # BLD AUTO: 5.6 THOU/MM3 (ref 4.8–10.8)

## 2025-03-24 ENCOUNTER — RESULTS FOLLOW-UP (OUTPATIENT)
Dept: FAMILY MEDICINE CLINIC | Age: 55
End: 2025-03-24